# Patient Record
Sex: FEMALE | Race: WHITE
[De-identification: names, ages, dates, MRNs, and addresses within clinical notes are randomized per-mention and may not be internally consistent; named-entity substitution may affect disease eponyms.]

---

## 2017-10-21 ENCOUNTER — HOSPITAL ENCOUNTER (INPATIENT)
Dept: HOSPITAL 41 - JD.ED | Age: 82
LOS: 4 days | Discharge: HOME | DRG: 206 | End: 2017-10-25
Payer: MEDICARE

## 2017-10-21 DIAGNOSIS — R61: ICD-10-CM

## 2017-10-21 DIAGNOSIS — J18.9: Primary | ICD-10-CM

## 2017-10-21 DIAGNOSIS — I10: ICD-10-CM

## 2017-10-21 DIAGNOSIS — M25.512: ICD-10-CM

## 2017-10-21 DIAGNOSIS — E83.40: ICD-10-CM

## 2017-10-21 DIAGNOSIS — K59.09: ICD-10-CM

## 2017-10-21 DIAGNOSIS — Z91.81: ICD-10-CM

## 2017-10-21 DIAGNOSIS — Z79.82: ICD-10-CM

## 2017-10-21 DIAGNOSIS — I95.1: ICD-10-CM

## 2017-10-21 DIAGNOSIS — Z66: ICD-10-CM

## 2017-10-21 DIAGNOSIS — G25.81: ICD-10-CM

## 2017-10-21 DIAGNOSIS — M54.2: ICD-10-CM

## 2017-10-21 DIAGNOSIS — R09.02: ICD-10-CM

## 2017-10-21 DIAGNOSIS — M19.90: ICD-10-CM

## 2017-10-21 DIAGNOSIS — K21.9: ICD-10-CM

## 2017-10-21 DIAGNOSIS — H54.7: ICD-10-CM

## 2017-10-21 DIAGNOSIS — F41.9: ICD-10-CM

## 2017-10-21 DIAGNOSIS — M94.9: ICD-10-CM

## 2017-10-21 DIAGNOSIS — G20: ICD-10-CM

## 2017-10-21 DIAGNOSIS — G70.00: ICD-10-CM

## 2017-10-21 DIAGNOSIS — Z79.899: ICD-10-CM

## 2017-10-21 DIAGNOSIS — F32.9: ICD-10-CM

## 2017-10-21 DIAGNOSIS — E87.0: ICD-10-CM
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
## 2017-10-21 DIAGNOSIS — E78.5: ICD-10-CM

## 2017-10-21 DIAGNOSIS — J40: ICD-10-CM

## 2017-10-21 DIAGNOSIS — R53.82: ICD-10-CM
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
## 2017-10-21 DIAGNOSIS — Z23: ICD-10-CM

## 2017-10-21 DIAGNOSIS — R51: ICD-10-CM

## 2017-10-21 PROCEDURE — 83880 ASSAY OF NATRIURETIC PEPTIDE: CPT

## 2017-10-21 PROCEDURE — 36415 COLL VENOUS BLD VENIPUNCTURE: CPT

## 2017-10-21 PROCEDURE — 72125 CT NECK SPINE W/O DYE: CPT

## 2017-10-21 PROCEDURE — 85025 COMPLETE CBC W/AUTO DIFF WBC: CPT

## 2017-10-21 PROCEDURE — 71020: CPT

## 2017-10-21 PROCEDURE — 87040 BLOOD CULTURE FOR BACTERIA: CPT

## 2017-10-21 PROCEDURE — 80053 COMPREHEN METABOLIC PANEL: CPT

## 2017-10-21 PROCEDURE — 70450 CT HEAD/BRAIN W/O DYE: CPT

## 2017-10-21 PROCEDURE — 96365 THER/PROPH/DIAG IV INF INIT: CPT

## 2017-10-21 PROCEDURE — 99285 EMERGENCY DEPT VISIT HI MDM: CPT

## 2017-10-21 PROCEDURE — 93005 ELECTROCARDIOGRAM TRACING: CPT

## 2017-10-21 PROCEDURE — 96375 TX/PRO/DX INJ NEW DRUG ADDON: CPT

## 2017-10-21 PROCEDURE — 84484 ASSAY OF TROPONIN QUANT: CPT

## 2017-10-21 PROCEDURE — 86738 MYCOPLASMA ANTIBODY: CPT

## 2017-10-21 PROCEDURE — 73502 X-RAY EXAM HIP UNI 2-3 VIEWS: CPT

## 2017-10-21 PROCEDURE — 73030 X-RAY EXAM OF SHOULDER: CPT

## 2017-10-21 PROCEDURE — 86140 C-REACTIVE PROTEIN: CPT

## 2017-10-21 PROCEDURE — G0009 ADMIN PNEUMOCOCCAL VACCINE: HCPCS

## 2017-10-21 RX ADMIN — VITAMIN D, TAB 1000IU (100/BT) SCH UNITS: 25 TAB at 20:36

## 2017-10-21 RX ADMIN — Medication SCH MG: at 20:26

## 2017-10-21 NOTE — EDM.PDOC
ED HPI GENERAL MEDICAL PROBLEM





- General


Chief Complaint: Trauma


Stated Complaint: [facility]


Time Seen by Provider: 10/21/17 09:16





- History of Present Illness


INITIAL COMMENTS - FREE TEXT/NARRATIVE: 





84-year-old female presents emergency room brought in by EMS after falling at 

her assisted living center.





The patient denies any dizziness or palpitations. However, while putting her 

earrings on this morning the patient fell backwards she has a 17 or 18 year 

history of Parkinson's disease. She complains mostly of a headache and neck 

pain she has some right hip discomfort and left shoulder discomfort. She does 

not have chest pain or breathing difficulties. The patient feels weak however 

this is not new. Patient is not on anticoagulation at this time. She is taking 

Namenda and her Parkinson's medication. She is treated for hypertension. She 

did take her medications just before coming in. Her blood pressure is noted to 

be quite elevated upon arrival here.





The patient has had a hip replacement on the right she has some discomfort 

after the fall this seems to be improving she has some left shoulder 

discomfort. She comes in with a c-collar in place and her discomfort seems to 

be improving she did strike the back of her head she has no bleeding or 

laceration.


  ** Right Hip


Pain Score (Numeric/FACES): 5





  ** Left Shoulder


Pain Score (Numeric/FACES): 5





  ** Head


Pain Score (Numeric/FACES): 5





- Related Data


 Allergies











Allergy/AdvReac Type Severity Reaction Status Date / Time


 


No Known Allergies Allergy   Verified 10/21/17 09:33











Home Meds: 


 Home Meds





Acetaminophen 650 mg PO BID 06/10/15 [History]


Acetaminophen 650 mg PO Q4H PRN 06/10/15 [History]


Calcium Carbonate [Tums] 500 mg PO DAILY PRN 06/10/15 [History]


Carbidopa/Levodopa [Sinemet  mg] 1 tab PO BID 06/10/15 [History]


Carbidopa/Levodopa [Sinemet Cr  mg] 1 tab PO QID 06/10/15 [History]


Docusate Sodium 100 mg PO BID 06/10/15 [History]


Memantine HCl [Namenda Xr] 28 mg PO DAILY 06/10/15 [History]


Metoprolol Tartrate 12.5 mg PO BID 06/10/15 [History]


Omeprazole [Prilosec] 20 mg PO DAILY 06/10/15 [History]


Sertraline HCl [Zoloft] 75 mg PO DAILY 06/10/15 [History]


Simethicone [Gas-X] 80 mg PO QID 06/10/15 [History]


clonazePAM [Klonopin] 0.5 mg PO QPM 06/10/15 [History]


guaiFENesin/Dextromethorphan [Tussin Dm Liquid] 5 ml PO Q4H PRN 06/10/15 [

History]


rOPINIRole HCl [Requip] 4 mg PO BID 06/10/15 [History]


Aspirin [Adult Low Dose Aspirin EC] 81 mg PO DAILY 07/04/16 [History]


Cholecalciferol (Vitamin D3) [Vitamin D3] 1,000 unit PO BID 07/04/16 [History]


Magnesium Chloride [Slow-Mag] 250 mg PO DAILY 07/04/16 [History]


Omega-3/DHA/Epa/Fish Oil [Fish Oil 1,000 mg Softgel] 1,000 mg PO BID 07/04/16 [

History]


Polyethylene Glycol 3350 [MiraLAX] 17 gm PO QAM PRN 07/04/16 [History]


Sennosides [Senna] 1 tab PO BID 07/04/16 [History]


Calcium Carbonate [Calcium] 500 mg PO DAILY 10/21/17 [History]


Cyanocobalamin (Vitamin B12) [Vitamin B12] 1,000 mcg PO DAILY 10/21/17 [History]


Donepezil [Aricept] 10 mg PO BEDTIME 10/21/17 [History]


Enalapril [Vasotec] 5 mg PO DAILY 10/21/17 [History]


Gabapentin [Neurontin] 200 mg PO TID 10/21/17 [History]


Guaifenesin/Pseudoephedrne HCl [Mucinex D -60 mg Tablet] 1 tab PO BID PRN 

10/21/17 [History]


Hydrocortisone [Preparation H] 1 applic TOP BID PRN 10/21/17 [History]


Mag Hydrox/Al Hydrox/Simeth [Antacid Plus Anti-Gas] 10 ml PO DAILY 10/21/17 [

History]


Naproxen Sodium [Aleve] 220 mg PO BID 10/21/17 [History]


Oxybutynin 2.5 mg PO BID 10/21/17 [History]


Polyethylene Glycol 3350 [MiraLAX] 17 g PO DAILY 10/21/17 [History]


QUEtiapine [SEROquel] 12.5 mg PO BEDTIME 10/21/17 [History]


Simvastatin [Zocor] 10 mg PO BEDTIME 10/21/17 [History]











Past Medical History


HEENT History: Reports: Impaired Vision


Gastrointestinal History: Reports: Chronic Constipation


Other Genitourinary History: hyperhidrosis


OB/GYN History: Reports: Pregnancy


Neurological History: Reports: Parkinson's (diagnosed in 2000. Has been 

followed by Dr Braden since that time.)





- Past Surgical History


Musculoskeletal Surgical History: Reports: Hip Replacement





Social & Family History





- Tobacco Use


Smoking Status *Q: Never Smoker





- Alcohol Use


Days Per Week of Alcohol Use: 0





- Recreational Drug Use


Recreational Drug Use: No





- Living Situation & Occupation


Living situation: Reports: Assisted Living


Occupation: Retired





Review of Systems





- Review of Systems


Review Of Systems: See Below


Constitutional: Reports: Weakness.  Denies: No Symptoms, Chills, Fever


Eyes: Reports: No Symptoms


Ears: Reports: No Symptoms


Nose: Reports: No Symptoms


Mouth/Throat: Reports: No Symptoms


Respiratory: Reports: No Symptoms


Cardiovascular: Reports: No Symptoms


GI/Abdominal: Reports: No Symptoms


Genitourinary: Reports: No Symptoms


Musculoskeletal: Reports: No Symptoms


Skin: Reports: No Symptoms


Neurological: Reports: Weakness, Other (She has a history of Parkinson's)


Psychiatric: Reports: No Symptoms





ED EXAM, GENERAL





- Physical Exam


Exam: See Below


Exam Limited By: No Limitations


General Appearance: Alert, No Apparent Distress, Other (C collar in place the 

patient answers questions appropriately and is able to participate with the 

exam she is a little vague in answering some of her questions.)


Eye Exam: Bilateral Eye: EOMI, Normal Inspection


Ear Exam: Bilateral Ear: Canal Normal, TM normal


Nose: Normal Inspection, Normal Mucosa, No Blood


Throat/Mouth: Normal Inspection, Normal Lips, Normal Oropharynx


Head: Atraumatic, Normocephalic


Neck: Other (C collar in place)


Respiratory/Chest: No Respiratory Distress, Lungs Clear, Normal Breath Sounds


Cardiovascular: Regular Rate, Rhythm, No Edema, No Murmur


GI/Abdominal: Normal Bowel Sounds, Soft, Non-Tender


Extremities: Other (No crepitation or discomfort with pelvic manipulation she 

has some vague discomfort left shoulder the patient can move her right hip in 

the supine position she can lift her leg up internally and externally rotated 

against resistance without significant discomfort neurovascular status of the 

foot is normal)


Neurological: Alert, Oriented, Normal Cognition


Psychiatric: Normal Affect, Flat Affect


Skin Exam: Warm, Dry, Intact


Lymphatic: No Adenopathy





Course





- Vital Signs


Last Recorded V/S: 


 Last Vital Signs











Temp  36.2 C   10/21/17 09:18


 


Pulse  63   10/21/17 10:55


 


Resp  14   10/21/17 09:18


 


BP  197/86 H  10/21/17 10:55


 


Pulse Ox  94 L  10/21/17 09:18














- Orders/Labs/Meds


Orders: 


 Active Orders 24 hr











 Category Date Time Status


 


 EKG Documentation Completion [RC] STAT Care  10/21/17 10:54 Active


 


 Cervical Spine wo Cont [CT] Stat Exams  10/21/17 09:29 Taken


 


 Chest 2V [CR] Stat Exams  10/21/17 10:47 Taken


 


 Head wo Cont [CT] Stat Exams  10/21/17 09:29 Taken


 


 Hip Min 2V or 3V w Pelvis Rt [CR] Stat Exams  10/21/17 09:30 Taken


 


 Shoulder Comp Lt [CR] Stat Exams  10/21/17 09:30 Taken


 


 CULTURE BLOOD [BC] Stat Lab  10/21/17 12:41 Received


 


 CULTURE BLOOD [BC] Stat Lab  10/21/17 12:47 Received


 


 cefTRIAXone [Rocephin] 1 gm Med  10/21/17 13:10 Active





 Sodium Chloride 0.9% [Normal Saline] 100 ml   





 IV ONETIME   


 


 Blood Culture x2 Reflex Set [OM.PC] Stat Oth  10/21/17 12:17 Ordered








 Medication Orders





Ceftriaxone Sodium 1 gm/ (Sodium Chloride)  100 mls @ 200 mls/hr IV ONETIME ONE


   Stop: 10/21/17 13:39


   Last Admin: 10/21/17 13:18  Dose: 200 mls/hr








Labs: 


 Laboratory Tests











  10/21/17 10/21/17 10/21/17 Range/Units





  10:59 10:59 10:59 


 


WBC  7.59    (3.98-10.04)  K/mm3


 


RBC  4.30    (3.98-5.22)  M/mm3


 


Hgb  13.7    (11.2-15.7)  gm/L


 


Hct  40.7    (34.1-44.9)  %


 


MCV  94.7    (79.4-94.8)  fl


 


MCH  31.9    (25.6-32.2)  pg


 


MCHC  33.7    (32.2-35.5)  g/dl


 


RDW Std Deviation  49.3 H    (36.4-46.3)  fL


 


Plt Count  114 L    (182-369)  K/mm3


 


MPV  9.9    (9.4-12.3)  fl


 


Neutrophils % (Manual)  69 H    (40-60)  %


 


Band Neutrophils %  0    (0-10)  %


 


Lymphocytes % (Manual)  25    (20-40)  %


 


Atypical Lymphs %  0    %


 


Monocytes % (Manual)  2    (2-10)  %


 


Eosinophils % (Manual)  4    (0.7-5.8)  %


 


Basophils % (Manual)  0 L    (0.1-1.2)  


 


Platelet Estimate  Adequate    


 


Plt Morphology Comment  Normal    


 


RBC Morph Comment  Normal    


 


Sodium   148 H   (136-145)  mEq/L


 


Potassium   3.9   (3.5-5.1)  mEq/L


 


Chloride   110 H   ()  mEq/L


 


Carbon Dioxide   24   (21-32)  mEq/L


 


Anion Gap   17.9 H   (5-15)  


 


BUN   26 H   (7-18)  mg/dL


 


Creatinine   1.0   (0.55-1.02)  mg/dL


 


Est Cr Clr Drug Dosing   37.68   mL/min


 


Estimated GFR (MDRD)   53   (>60)  mL/min


 


BUN/Creatinine Ratio   26.0 H   (14-18)  


 


Glucose   108   ()  mg/dL


 


Calcium   8.8   (8.5-10.1)  mg/dL


 


Total Bilirubin   0.5   (0.2-1.0)  mg/dL


 


AST   15   (15-37)  U/L


 


ALT   8 L   (14-59)  U/L


 


Alkaline Phosphatase   70   ()  U/L


 


Troponin I    < 0.017  (0.00-0.056)  ng/mL


 


NT-Pro-B Natriuret Pep    TNP  


 


Total Protein   6.4   (6.4-8.2)  g/dl


 


Albumin   3.8   (3.4-5.0)  g/dl


 


Globulin   2.6   gm/dL


 


Albumin/Globulin Ratio   1.5   (1-2)  











Meds: 


Medications











Generic Name Dose Route Start Last Admin





  Trade Name Freq  PRN Reason Stop Dose Admin


 


Ceftriaxone Sodium 1 gm/  100 mls @ 200 mls/hr  10/21/17 13:10  10/21/17 13:18





  Sodium Chloride  IV  10/21/17 13:39  200 mls/hr





  ONETIME ONE   Administration














Discontinued Medications














Generic Name Dose Route Start Last Admin





  Trade Name Breana  PRN Reason Stop Dose Admin


 


Ceftriaxone Sodium  1,000 mg  10/21/17 12:30  





  Rocephin  IVPUSH   





  Q24H JESSY   


 


Labetalol HCl  5 mg  10/21/17 10:15  10/21/17 10:27





  Normodyne  IVPUSH  10/21/17 10:16  5 mg





  ONETIME ONE   Administration





  Protocol   


 


Labetalol HCl  5 mg  10/21/17 10:55  10/21/17 10:55





  Normodyne  IVPUSH  10/21/17 10:56  5 mg





  ONETIME ONE   Administration





  Protocol   














- Re-Assessments/Exams


Free Text/Narrative Re-Assessment/Exam: 





10/21/17 10:27


Head and neck CT were obtained which show no acute changes. Favor shoulder show 

some degenerative changes fracture dislocation visualized portion of the 

clavicle appears normal pelvis and hip show a prosthesis that appears to be in 

good alignment no obvious loosening however I will have radiology over read 

this.





C-collar removed patient is doing much better with this she has reasonable 

range of motion with her left shoulder. She states it was clicking and grinding 

while pushing her wheelchair around, this was before her fall but she cannot 

get it to make noise now. Patient's blood pressure was initially quite elevated 

at 202 she received 5 mg labetalol and this is coming down. Patient's O2 

saturation is as low as 86% at times usually around 88% to 90%. She does have 

this intermittent cough we'll go ahead and check a chest x-ray


10/21/17 12:26


Chest x-ray is suspicious for left lower lobe retrocardiac infiltrate. Patient 

has been started on oxygen and we tried to wean her off  her sats dropped to 87-

88%. Case discussed with Dr. Herrera our hospitalist who would like to get a 

radiology interpretation on this patient. I will start Rocephin 1 g after 

obtaining blood cultures.








10/21/17 13:19


Radiology thought she was developing bronchial wall thickening in the lower 

lobes suspicious of bronchitis with hypoxia patient be admitted on a suspicion 

for pneumonia.





Departure





- Departure


Time of Disposition: 13:10


Disposition: Admitted As Inpatient 66


Clinical Impression: 


 Hypoxia, Pneumonia, Hypertension








- Discharge Information


Referrals: 


RatPhill sandoval MD [Primary Care Provider] - 


Forms:  ED Department Discharge





- My Orders


Last 24 Hours: 


My Active Orders





10/21/17 09:29


Cervical Spine wo Cont [CT] Stat 


Head wo Cont [CT] Stat 





10/21/17 09:30


Hip Min 2V or 3V w Pelvis Rt [CR] Stat 


Shoulder Comp Lt [CR] Stat 





10/21/17 10:47


Chest 2V [CR] Stat 





10/21/17 10:54


EKG Documentation Completion [RC] STAT 





10/21/17 12:17


Blood Culture x2 Reflex Set [OM.PC] Stat 





10/21/17 12:41


CULTURE BLOOD [BC] Stat 





10/21/17 12:47


CULTURE BLOOD [BC] Stat 





10/21/17 13:10


cefTRIAXone [Rocephin] 1 gm   Sodium Chloride 0.9% [Normal Saline] 100 ml IV 

ONETIME 














- Assessment/Plan


Last 24 Hours: 


My Active Orders





10/21/17 09:29


Cervical Spine wo Cont [CT] Stat 


Head wo Cont [CT] Stat 





10/21/17 09:30


Hip Min 2V or 3V w Pelvis Rt [CR] Stat 


Shoulder Comp Lt [CR] Stat 





10/21/17 10:47


Chest 2V [CR] Stat 





10/21/17 10:54


EKG Documentation Completion [RC] STAT 





10/21/17 12:17


Blood Culture x2 Reflex Set [OM.PC] Stat 





10/21/17 12:41


CULTURE BLOOD [BC] Stat 





10/21/17 12:47


CULTURE BLOOD [BC] Stat 





10/21/17 13:10


cefTRIAXone [Rocephin] 1 gm   Sodium Chloride 0.9% [Normal Saline] 100 ml IV 

ONETIME

## 2017-10-21 NOTE — PCM.HP
H&P History of Present Illness





- General


Date of Service: 10/21/17


Admit Problem/Dx: 


 Admission Diagnosis/Problem





Admission Diagnosis/Problem      Pneumonia








Source of Information: Patient, Family, Old Records, Provider, RN Notes Reviewed


History Limitations: Reports: Physical Impairment





- History of Present Illness


Initial Comments - Free Text/Narative: 


This is an 84-year-old elderly white female with past medical history of 

Hypertension, Hyperlipidemia, GERD, Myasthenia Gravis, Osteoarthritis, Chronic 

Malaise and Fatigue, Bone and Cartilage disorder, Shortness of Breath, 

Magnesium Metabolism Disorder, Hyperhidrosis, Restless Leg Syndrome, Anxiety, 

Depression, and Paralysis Agitans/Parkinson's Disease who presents to emergency 

department for evaluation after suffering a a non traumatic fall at her 

assisted living facility. She denies any prodromal symptoms and reports no LOC. 





Patient carries a history of Parkinson's disease for over 17 or 18 years now. 

On presentation to the emergency department, she complains of headache, neck 

pain, some right hip discomfort and left shoulder pain. While in ED, she was 

found to have blood pressure readings of 199/94 and 197/96 mm per mercury. 





At time of my examination, she denies any of the above issues except for 

chronic generalized weakness. Her initial workup shows a CBC remarkable for 

platelet of 114, and neutrophils of 69%. Her chemistry is remarkable for sodium 

of 148, chloride of 110, anion gap of 17.9, BUN of 26, and ALT of 8. Her 

initial troponin is less than 0.017. Her head CT scan, cervical CT scan, and 

shoulder x-ray are all negative for acute abnormal findings. However her chest x

-ray report reads bronchial wall thickening in the lower lobe suspicious for 

bronchitis.





Patient is being admitted for bronchitis, malignant hypertension and status 

post non-traumatic fall. She is DNR/DNI








  ** Right Hip


Pain Score (Numeric/FACES): 5





  ** Left Shoulder


Pain Score (Numeric/FACES): 5





  ** Head


Pain Score (Numeric/FACES): 5





- Related Data


Allergies/Adverse Reactions: 


 Allergies











Allergy/AdvReac Type Severity Reaction Status Date / Time


 


No Known Allergies Allergy   Verified 10/21/17 09:33











Home Medications: 


 Home Meds





Acetaminophen 650 mg PO BID 06/10/15 [History]


Acetaminophen 650 mg PO Q4H PRN 06/10/15 [History]


Calcium Carbonate [Tums] 500 mg PO DAILY PRN 06/10/15 [History]


Carbidopa/Levodopa [Sinemet  mg] 1 tab PO BID 06/10/15 [History]


Carbidopa/Levodopa [Sinemet Cr  mg] 1 tab PO QID 06/10/15 [History]


Docusate Sodium 100 mg PO BID 06/10/15 [History]


Memantine HCl [Namenda Xr] 28 mg PO DAILY 06/10/15 [History]


Metoprolol Tartrate 12.5 mg PO BID 06/10/15 [History]


Omeprazole [Prilosec] 20 mg PO DAILY 06/10/15 [History]


Sertraline HCl [Zoloft] 75 mg PO DAILY 06/10/15 [History]


Simethicone [Gas-X] 80 mg PO QID 06/10/15 [History]


clonazePAM [Klonopin] 0.5 mg PO QPM 06/10/15 [History]


guaiFENesin/Dextromethorphan [Tussin Dm Liquid] 5 ml PO Q4H PRN 06/10/15 [

History]


rOPINIRole HCl [Requip] 4 mg PO BID 06/10/15 [History]


Aspirin [Adult Low Dose Aspirin EC] 81 mg PO DAILY 07/04/16 [History]


Cholecalciferol (Vitamin D3) [Vitamin D3] 1,000 unit PO BID 07/04/16 [History]


Omega-3/DHA/Epa/Fish Oil [Fish Oil 1,000 mg Softgel] 1,000 mg PO BID 07/04/16 [

History]


Polyethylene Glycol 3350 [MiraLAX] 17 gm PO QAM PRN 07/04/16 [History]


Sennosides [Senna] 1 tab PO BID 07/04/16 [History]


Calcium Carbonate [Calcium] 500 mg PO DAILY 10/21/17 [History]


Cyanocobalamin (Vitamin B12) [Vitamin B12] 1,000 mcg PO DAILY 10/21/17 [History]


Donepezil [Aricept] 10 mg PO BEDTIME 10/21/17 [History]


Enalapril [Vasotec] 5 mg PO DAILY 10/21/17 [History]


Gabapentin [Neurontin] 200 mg PO TID 10/21/17 [History]


Guaifenesin/Pseudoephedrne HCl [Mucinex D -60 mg Tablet] 1 tab PO BID PRN 

10/21/17 [History]


Hydrocortisone [Preparation H] 1 applic TOP BID PRN 10/21/17 [History]


Mag Hydrox/Al Hydrox/Simeth [Antacid Plus Anti-Gas] 10 ml PO DAILY PRN 10/21/17 

[History]


Naproxen Sodium [Aleve] 220 mg PO BID 10/21/17 [History]


Oxybutynin 2.5 mg PO BID 10/21/17 [History]


Polyethylene Glycol 3350 [MiraLAX] 17 g PO DAILY 10/21/17 [History]


QUEtiapine [SEROquel] 12.5 mg PO BEDTIME 10/21/17 [History]


Simvastatin [Zocor] 10 mg PO BEDTIME 10/21/17 [History]











Past Medical History


HEENT History: Reports: Impaired Vision


Other HEENT History: esophogeal reflux


Cardiovascular History: Reports: High Cholesterol, Hypertension


Respiratory History: Reports: SOB


Gastrointestinal History: Reports: Chronic Constipation


Other Genitourinary History: hyperhidrosis


OB/GYN History: Reports: Pregnancy


Musculoskeletal History: Reports: Osteoarthritis


Other Musculoskeletal History: cervical disc degeneration


Neurological History: Reports: Parkinson's (diagnosed in 2000. Has been 

followed by Dr Braden since that time.)


Other Neuro History: unsteady gait, altered mental status, RLS, myestenia gravis


Psychiatric History: Reports: Anxiety, Depression





- Past Surgical History


Musculoskeletal Surgical History: Reports: Hip Replacement





Social & Family History





- Family History


Family Medical History: Noncontributory





- Tobacco Use


Smoking Status *Q: Never Smoker


Second Hand Smoke Exposure: No





- Caffeine Use


Caffeine Use: Reports: None





- Alcohol Use


Days Per Week of Alcohol Use: 0





- Recreational Drug Use


Recreational Drug Use: No





- Living Situation & Occupation


Living situation: Reports: Assisted Living


Occupation: Retired





H&P Review of Systems





- Review of Systems:


Review Of Systems: See Below


General: Reports: Weakness.  Denies: Fever, Chills, Malaise


HEENT: Reports: No Symptoms


Pulmonary: Denies: Shortness of Breath


Cardiovascular: Denies: Chest Pain


Gastrointestinal: Denies: Abdominal Pain, Nausea, Vomiting


Genitourinary: Reports: No Symptoms


Musculoskeletal: Reports: No Symptoms


Skin: Denies: Cyanosis, Diaphoresis, Bruising, Erythema


Psychiatric: Denies: Confusion, Depression, Anxiety


Neurological: Reports: Pre-Existing Deficit, Weakness, Gait Disturbance.  Denies

: Confusion


Hematologic/Lymphatic: Reports: No Symptoms


Immunologic: Reports: No Symptoms





Exam





- Exam


Exam: See Below





- Vital Signs


Vital Signs: 


 Last Vital Signs











Temp  36.2 C   10/21/17 09:18


 


Pulse  63   10/21/17 10:55


 


Resp  14   10/21/17 09:18


 


BP  197/86 H  10/21/17 10:55


 


Pulse Ox  94 L  10/21/17 09:18











Weight: 70.307 kg





- Exam


General: Alert, Oriented, Cooperative


HEENT: Conjunctiva Clear, EACs Clear, EOMI, Hearing Intact, Mucosa Moist & Pink

, Nares Patent, Normal Nasal Septum, Posterior Pharynx Clear, Pupils Equal, 

Pupils Reactive


Neck: Supple, Trachea Midline


Lungs: Clear to Auscultation, Normal Respiratory Effort


Cardiovascular: Regular Rate, Regular Rhythm


GI/Abdominal Exam: Normal Bowel Sounds, Soft, Non-Tender, No Organomegaly, No 

Distention, No Abnormal Bruit


 (Female) Exam: Deferred


Rectal (Female) Exam: Deferred


Back Exam: Normal Inspection, Decreased Range of Motion


Extremities: Normal Inspection, Normal Range of Motion, Non-Tender, No Pedal 

Edema, Normal Capillary Refill, Other (Bradykinesia)


Peripheral Pulses: 2+: Posterior Tibial (L), Posterior Tibial (R), Dorsalis 

Pedis (L), Dorsalis Pedis (R)


Skin: Warm, Dry, Intact


Neuro Extensive - Mental Status: Oriented x3, Normal Cognition, Memory Intact


Neuro Extensive - Motor, Sensory, Reflexes: CN II-XII Intact (limitedt but 

fairly intact), Abnormal Gait, Tremor (resting)


Psychiatric: Alert, Normal Mood.  No: Normal Affect, Withdrawal Symptoms





- Patient Data


Result Diagrams: 


 10/22/17 06:14





 10/22/17 06:14





*Q Meaningful Use (ADM)





- VTE *Q


VTE Criteria *Q: 








- Stroke *Q


Stroke Criteria *Q: 








- AMI *Q


AMI Criteria *Q: 





Problem List Initiated/Reviewed/Updated: Yes


Assessment/Plan Comment:: 


Assessment/Plan:


Acute:


Status Post Fall


   - Non-traumatic


   - She is not on blood thinners


   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


   - All imaging studies are negative: Right Hip XR, Head CT and Cervical CT 

scan  


   - Fall Precautions


   - PT/OT for eval





Bronchitis


   - CXR report reads bronchial lower lobe thickening; no infiltrate or 

consolidations


   - She has cough but w/o fever    


   - I do not think she has pneumonia


   - She already received IV ATB in ED; Continue IV Rocephin and will add 

Azithromycin


   - Mycoplasma Ag/Strep pneumonia Test and Sputum Cx





Malignant HTN


   - Has hx/o HTN


   - Blood pressure readings of 199/94 and 197/96 mm per mercury on admission


   - Resume Home BP Meds


   - PRN anti-hypertensive medications for BP > 140/90





Chronic:


Hypertension


Hyperlipidemia


GERD


Myasthenia gravis


Osteoarthritis


Malaise and Fatigue


Bone and cartilage disorder


Shortness of breath


Magnesium metabolism disorder


Hyperhidrosis


Restless leg syndrome


Anxiety


Depression


Paralysis Agitans/Parkinson's Disease





Plan:


Admit to Med-Surg 


Resume Home Meds


Routine AM Labs


PT/OT consult


Fall Precautions


CM/SW for d/c planning


Additional orders as above


Code status: DNR/DNI

## 2017-10-22 RX ADMIN — VITAMIN D, TAB 1000IU (100/BT) SCH UNITS: 25 TAB at 20:26

## 2017-10-22 RX ADMIN — CYANOCOBALAMIN TAB 1000 MCG SCH MCG: 1000 TAB at 10:05

## 2017-10-22 RX ADMIN — HYDRALAZINE HYDROCHLORIDE PRN MG: 20 INJECTION INTRAMUSCULAR; INTRAVENOUS at 12:31

## 2017-10-22 RX ADMIN — Medication SCH MG: at 10:14

## 2017-10-22 RX ADMIN — VITAMIN D, TAB 1000IU (100/BT) SCH UNITS: 25 TAB at 12:07

## 2017-10-22 RX ADMIN — CARBIDOPA AND LEVODOPA SCH: 25; 100 TABLET, EXTENDED RELEASE ORAL at 02:26

## 2017-10-22 RX ADMIN — ALUMINUM HYDROXIDE, MAGNESIUM HYDROXIDE, AND SIMETHICONE SCH ML: 200; 200; 20 SUSPENSION ORAL at 12:06

## 2017-10-22 RX ADMIN — CARBIDOPA AND LEVODOPA SCH: 25; 100 TABLET, EXTENDED RELEASE ORAL at 02:20

## 2017-10-22 RX ADMIN — CARBIDOPA AND LEVODOPA SCH TAB: 25; 100 TABLET, EXTENDED RELEASE ORAL at 14:25

## 2017-10-22 RX ADMIN — CARBIDOPA AND LEVODOPA SCH TAB: 25; 100 TABLET, EXTENDED RELEASE ORAL at 10:07

## 2017-10-22 RX ADMIN — CARBIDOPA AND LEVODOPA SCH TAB: 25; 100 TABLET, EXTENDED RELEASE ORAL at 20:29

## 2017-10-22 RX ADMIN — CARBIDOPA AND LEVODOPA SCH TAB: 25; 100 TABLET, EXTENDED RELEASE ORAL at 02:20

## 2017-10-22 RX ADMIN — Medication SCH MG: at 20:35

## 2017-10-22 NOTE — CR
Left shoulder: Three views of the left shoulder were obtained.

 

Comparison: No prior left shoulder study.

 

Joint space narrowing and degenerative sclerosis and subchondral 

cystic change is seen within the glenohumeral joint.  Inferior 

spurring is also noted within the glenohumeral joint.  Calcification 

is seen off the medial inferior shoulder believed to be dystrophic and

 incidental.  Acromioclavicular joint shows minimal degenerative 

change.  No acute fracture, dislocation or other bony abnormality is 

seen.

 

Impression:

1.  Severe degenerative change within the glenohumeral joint.

2.  Other incidental findings.  Nothing acute is appreciated.

 

Diagnostic code #2

## 2017-10-22 NOTE — CT
Head CT

 

Technique: Multiple axial sections through the brain were obtained.  

Intravenous contrast was not utilized.

 

Comparison: Previous head CT study of 07/04/16.

 

Findings: Ventricles along with basal cisterns and sulci over the 

convexities are mildly prominent for the patient's age.  Diminished 

density is noted within portions of the periventricular and 

subcortical white matter compatible with small vessel ischemic 

demyelination change.  No other abnormal parenchymal densities are 

seen.  No evidence of intracranial hemorrhage.  No midline shift or 

mass effect is seen.

 

Bone window settings were reviewed which show no acute calvarial 

abnormality.  Visualized sinuses are clear.

 

Impression:

1.  Senescent change as described above.

2.  Nothing acute is appreciated on noncontrast head CT study.  

 

Diagnostic code #2

 

I agree with preliminary report issued by [facility] ([facility] 

preliminary report dictated on 10/21/17, 11:19 AM Central Time)

## 2017-10-22 NOTE — CT
CT cervical spine

 

Technique: Multiple axial sections were obtained from above C1 

inferiorly to the mid T2 level.  Reconstructed sagittal and coronal 

images were reviewed.

 

Comparison: Prior CT cervical spine exam of 09/18/12 is available.

 

Findings: Moderate disc space narrowing is noted at C2-C3 through 

C4-C5.  Severe disc space narrowing is noted C5-C6 and C6-C7.  

Degenerative endplate sclerosis is noted C5-C6 and C6-C7.  Posterior 

osteophytes are noted at C3-C4, C5-C6 and C6-C7.  Anterior osteophytes

 are scattered throughout the cervical spine which are most prominent 

at C5-C6 and C6-C7.  Degenerative uncovertebral change is seen 

throughout the cervical spine which are most prominent at C5-C6 and 

C6-C7.  Degenerative change is noted between the dens and anterior 

arch of C1.  Moderate left-sided neural foraminal stenosis is noted at

 C3-C4.  Mild to moderate bilateral neural foraminal stenosis is noted

 at C4-C5.  Moderate bilateral neural foraminal stenosis is noted at 

C5-C6.

 

Vertebral bodies and posterior arches are intact with no fracture 

being seen.  No abnormal subluxation is noted.

 

Diffuse degenerative apophyseal change is scattered throughout the 

cervical spine.

 

Impression:

1.  Diffuse degenerative change as described above.

2.  Nothing acute is seen on CT study of the cervical spine.

3.  When compared to prior cervical spine CT exam, degenerative change

 has mildly progressed.

 

Diagnostic code #2

 

I agree with preliminary report issued by [employer] ([facility] 

preliminary report dictated on 10/21/17, 11:21 AM Central Time)

## 2017-10-22 NOTE — CR
Chest:  Two views of the chest were obtained.

 

Comparison: Previous chest x-ray of 06/10/15.

 

Slight bronchial wall thickening is seen within portions of the 

perihilar markings.  Minimal bronchitis is possible.  Lungs otherwise 

are clear.  Heart size appears at the upper limits of normal.  

Tortuous thoracic aorta is seen.  Mild degenerative change is 

scattered within the spine.

 

Impression:

1.  Possible slight bronchitis within the right perihilar region.

2.  Other incidental findings.

 

Diagnostic code #3

 

Agree with preliminary report issued by [employer] ([facility] 

preliminary report dictated on 10/21/17, 1:33 PM Central Time)

## 2017-10-22 NOTE — PCM.PN
- General Info


Date of Service: 10/22/17


Admission Dx/Problem (Free Text): 


 Admission Diagnosis/Problem





Admission Diagnosis/Problem      Pneumonia








Subjective Update: 


Follow Up





Functional Status: Reports: Pain Controlled, Tolerating Diet, Urinating, New 

Symptoms (Chest pain with sneezing)





- Review of Systems


General: Denies: Fever, Weakness, Fatigue, Malaise, Chills


HEENT: Reports: No Symptoms


Pulmonary: Denies: Shortness of Breath


Cardiovascular: Reports: Chest Pain


Gastrointestinal: Denies: Abdominal Pain, Nausea, Vomiting


Genitourinary: Reports: No Symptoms


Musculoskeletal: Reports: No Symptoms


Skin: Denies: Cyanosis


Neurological: Reports: Difficulty Walking, Gait Disturbance.  Denies: Confusion

, Weakness


Psychiatric: Denies: Depression, Anxiety, Agitation, Hallucinations


Systems Review Comment:: 


No significant overnight or acute issues. She slept good. She reports chest 

pain with sneezing. Her HRs slightly dropped in the upper 50s. She has no other 

new complaints.








- Patient Data


Vitals - Most Recent: 


 Last Vital Signs











Temp  36.4 C   10/22/17 05:20


 


Pulse  59 L  10/22/17 05:20


 


Resp  18   10/22/17 05:20


 


BP  138/78   10/22/17 04:00


 


Pulse Ox  93 L  10/22/17 05:20











Weight - Most Recent: 70.307 kg


I&O - Last 24 Hours: 


 Intake & Output











 10/21/17 10/22/17 10/22/17





 22:59 06:59 14:59


 


Intake Total  400 


 


Balance  400 











Lab Results Last 24 Hours: 


 Laboratory Results - last 24 hr











  10/22/17 Range/Units





  06:14 


 


WBC  5.84  (3.98-10.04)  K/mm3


 


RBC  4.20  (3.98-5.22)  M/mm3


 


Hgb  13.4  (11.2-15.7)  gm/L


 


Hct  40.4  (34.1-44.9)  %


 


MCV  96.2 H  (79.4-94.8)  fl


 


MCH  31.9  (25.6-32.2)  pg


 


MCHC  33.2  (32.2-35.5)  g/dl


 


RDW Std Deviation  51.1 H  (36.4-46.3)  fL


 


Plt Count  108 L  (182-369)  K/mm3


 


MPV  10.5  (9.4-12.3)  fl


 


Neut % (Auto)  62.1  (34.0-71.1)  %


 


Lymph % (Auto)  22.3  (19.3-51.7)  %


 


Mono % (Auto)  6.8  (4.7-12.5)  %


 


Eos % (Auto)  8.2 H  (0.7-5.8)  


 


Baso % (Auto)  0.3  (0.1-1.2)  %


 


Neut # (Auto)  3.62  (1.56-6.13)  K/mm3


 


Lymph # (Auto)  1.30  (1.18-3.74)  K/mm3


 


Mono # (Auto)  0.40 H  (0.24-0.36)  K/mm3


 


Eos # (Auto)  0.48 H  (0.04-0.36)  K/mm3


 


Baso # (Auto)  0.02  (0.01-0.08)  K/mm3











Med Orders - Current: 


 Current Medications





Acetaminophen (Tylenol)  650 mg PO Q4H PRN


   PRN Reason: Pain


Acetaminophen (Tylenol)  650 mg PO BID [facility]


   Last Admin: 10/21/17 20:36 Dose:  650 mg


Hydrocodone Bitart/Acetaminophen (Norco 325-5 Mg)  1 tab PO Q4H PRN


   PRN Reason: Pain (moderate 4-6)


Al Hydroxide/Mg Hydroxide (Mag-Al Plus)  10 ml PO DAILY [facility]


Albuterol/Ipratropium (Duoneb 3.0-0.5 Mg/3 Ml)  3 ml NEB Q4H PRN


   PRN Reason: Shortness Of Breath/wheezing


Aspirin (Halfprin)  81 mg PO DAILY [facility]


Bisacodyl (Dulcolax)  5 mg PO DAILY PRN


   PRN Reason: Constipation


Calcium Carbonate/Glycine (Tums)  500 mg PO DAILY [facility]


Calcium Carbonate/Glycine (Tums)  500 mg PO DAILY PRN


   PRN Reason: Heartburn


Carbidopa/Levodopa (Sinemet Cr  Mg)  2 tab PO Q6H [facility]


   Last Admin: 10/22/17 02:26 Dose:  Not Given


Carbidopa/Levodopa (Sinemet  Mg)  1 tab PO DAILY@0600 [facility]


   Last Admin: 10/22/17 05:31 Dose:  1 tab


Cholecalciferol (Vitamin D3)  1,000 units PO BID [facility]


   Last Admin: 10/21/17 20:36 Dose:  1,000 units


Clonazepam (Klonopin)  0.5 mg PO QPM [facility]


   Last Admin: 10/21/17 17:24 Dose:  0.5 mg


Cyanocobalamin (Vitamin B12)  1,000 mcg PO DAILY [facility]


Docusate Sodium (Colace)  100 mg PO BID PRN


   PRN Reason: Constipation


Donepezil HCl (Aricept)  10 mg PO BEDTIME [facility]


   Last Admin: 10/21/17 20:31 Dose:  10 mg


Enalapril Maleate (Vasotec)  5 mg PO DAILY [facility]


Gabapentin (Neurontin)  200 mg PO TID [facility]


   Last Admin: 10/21/17 20:29 Dose:  200 mg


Guaifenesin/Phenylephrine HCl (Robitussin Dm)  5 ml PO Q4H PRN


   PRN Reason: Cough


Hydralazine HCl (Apresoline)  10 mg IVPUSH Q4H PRN


   PRN Reason: Hypertension


Hydrocortisone (Hydrocortisone 1% Crm)  0 gm TOP BID PRN


   PRN Reason: Pain


Hydromorphone HCl (Dilaudid)  0.25 mg IVPUSH Q2H PRN


   PRN Reason: Pain (severe 7-10)


Azithromycin 500 mg/ Sodium (Chloride)  250 mls @ 250 mls/hr IV Q24H [facility]


Ceftriaxone Sodium 1 gm/ (Sodium Chloride)  100 mls @ 200 mls/hr IV Q24H [facility]


Lorazepam (Ativan)  0.5 mg IV Q6H PRN


   PRN Reason: Anxiety


Magnesium Sulfate (Pharmacy To Dose - Magnesium Replacement)  1 dose .XX 

ASDIRECTED [facility]


Metoprolol Tartrate (Lopressor)  12.5 mg PO BID [facility]


   Last Admin: 10/21/17 20:32 Dose:  12.5 mg


Metoprolol Tartrate (Lopressor)  5 mg IVPUSH Q4H PRN


   PRN Reason: Tachycardia


Memantine Xr [ (Nemenda Xr] 28mg)  28 mg PO DAILY [facility]


Naproxen Sodium (220mg)  220 mg PO BID [facility]


Ondansetron HCl (Zofran)  4 mg IV Q6H PRN


   PRN Reason: Nausea/Vomiting


Oxybutynin Chloride (Oxybutynin)  2.5 mg PO BID [facility]


   Last Admin: 10/21/17 20:27 Dose:  2.5 mg


Pantoprazole Sodium (Protonix***)  40 mg PO DAILY [facility]


Polyethylene Glycol (Miralax)  17 gm PO DAILY PRN


   PRN Reason: Constipation


Polyethylene Glycol (Miralax)  17 gm PO DAILY [facility]


Potassium Chloride (Pharmacy To Dose - Potassium Replacement)  1 dose .XX 

ASDIRECTED [facility]


Quetiapine Fumarate (Seroquel)  12.5 mg PO BEDTIME [facility]


   Last Admin: 10/21/17 20:29 Dose:  12.5 mg


Ropinirole HCl (Requip)  4 mg PO BID [facility]


   Last Admin: 10/21/17 20:35 Dose:  4 mg


Saccharomyces Boulardii (Florastor)  500 mg PO BID [facility]


   Last Admin: 10/21/17 20:26 Dose:  500 mg


Senna (Senna)  8.6 mg PO BID [facility]


   Last Admin: 10/21/17 20:36 Dose:  8.6 mg


Senna/Docusate Sodium (Senna Plus)  1 tab PO BID PRN


   PRN Reason: Constipation


Sertraline HCl (Zoloft)  75 mg PO DAILY [facility]


Simethicone (Simethicone)  80 mg PO QID [facility]


   Last Admin: 10/21/17 20:25 Dose:  80 mg


Simvastatin (Zocor)  10 mg PO BEDTIME [facility]


   Last Admin: 10/21/17 20:33 Dose:  10 mg


Temazepam (Restoril)  7.5 mg PO BEDTIME PRN


   PRN Reason: Sleep





Discontinued Medications





Acetaminophen (Tylenol)  650 mg PO NOW ONE


   Stop: 10/21/17 13:36


   Last Admin: 10/21/17 13:39 Dose:  650 mg


Ceftriaxone Sodium (Rocephin)  1,000 mg IVPUSH Q24H [facility]


   Last Admin: 10/21/17 13:22 Dose:  Not Given


Ceftriaxone Sodium 1 gm/ (Sodium Chloride)  100 mls @ 200 mls/hr IV ONETIME ONE


   Stop: 10/21/17 13:39


   Last Admin: 10/21/17 13:18 Dose:  200 mls/hr


Azithromycin 500 mg/ Sodium (Chloride)  250 mls @ 250 mls/hr IV ONETIME ONE


   Stop: 10/21/17 17:10


   Last Admin: 10/21/17 17:23 Dose:  250 mls/hr


Labetalol HCl (Normodyne)  5 mg IVPUSH ONETIME ONE


   PRN Reason: Protocol


   Stop: 10/21/17 10:16


   Last Admin: 10/21/17 10:27 Dose:  5 mg


Labetalol HCl (Normodyne)  5 mg IVPUSH ONETIME ONE


   PRN Reason: Protocol


   Stop: 10/21/17 10:56


   Last Admin: 10/21/17 10:55 Dose:  5 mg











- Exam


General: Alert, Oriented, Cooperative, No Acute Distress


HEENT: Pupils Equal, Pupils Reactive, EOMI, Mucous Membr. Moist/Pink


Neck: Supple, Trachea Midline, No JVD


Lungs: Clear to Auscultation, Normal Respiratory Effort


Cardiovascular: Regular Rate, Regular Rhythm


GI/Abdominal Exam: Normal Bowel Sounds, Soft, Non-Tender, No Organomegaly, No 

Distention, No Abnormal Bruit, No Mass


 (Female) Exam: Deferred


Back Exam: Normal Inspection, Decreased Range of Motion


Extremities: Normal Inspection, Non-Tender, No Pedal Edema, Normal Capillary 

Refill, Other (bradykinesia)


Peripheral Pulses: 2+: Dorsalis Pedis (L), Dorsalis Pedis (R)


Skin: Warm, Dry, Intact


Neurological: No New Focal Deficit.  No: Normal Gait


Psy/Mental Status: Alert, Normal Mood.  No: Normal Affect, Anxious, Agitated, 

Suicidal Ideation, Withdrawal Symptoms





- Problem List Review


Problem List Initiated/Reviewed/Updated: Yes





- My Orders


Last 24 Hours: 


My Active Orders





10/21/17 15:57


Height and Weight [RC] DAILY 


Intake and Output [RC] 04,16 


Oxygen Therapy [RC] PRN 


Pulse Oximetry [RC] PRN 


Up With Assistance [RC] ASDIRECTED 


Up ad Tammy [RC] ASDIRECTED 


VTE/DVT Education [RC] PER UNIT ROUTINE 


Vital Signs [RC] Q4HR 


Acetaminophen/HYDROcodone [Norco 325-5 MG]   1 tab PO Q4H PRN 


Albuterol/Ipratropium [DuoNeb 3.0-0.5 MG/3 ML]   3 ml NEB Q4H PRN 


Bisacodyl [Dulcolax]   5 mg PO DAILY PRN 


Docusate Sodium [Colace]   100 mg PO BID PRN 


Docusate Sodium/Sennosides [Senna Plus]   1 tab PO BID PRN 


HYDROmorphone [Dilaudid]   0.25 mg IVPUSH Q2H PRN 


LORazepam [Ativan]   0.5 mg IV Q6H PRN 


Ondansetron [Zofran]   4 mg IV Q6H PRN 


Polyethylene Glycol 3350 [MiraLAX]   17 gm PO DAILY PRN 


Temazepam [Restoril]   7.5 mg PO BEDTIME PRN 


Sequential Compression Device [OM.PC] Per Unit Routine 


Resuscitation Status Routine 





10/21/17 16:03


Antiembolic Devices [RC] QSHIFT 





10/21/17 16:05


RT Aerosol Therapy [RC] ASDIRECTED 


Acetaminophen [Tylenol]   650 mg PO Q4H PRN 


Calcium Carbonate [Tums]   500 mg PO DAILY PRN 


Dextromethorphan/guaiFENesin [Robitussin DM]   5 ml PO Q4H PRN 


Hydrocortisone [Hydrocortisone 1% Crm]   0 gm TOP BID PRN 





10/21/17 16:10


CULTURE SPUTUM + SMEAR [RM] Stat 


STREP PNEUMONIAE ANTIGEN [MREF] Stat 





10/21/17 17:00


Simethicone   80 mg PO QID 





10/21/17 17:02


Metoprolol Tartrate [Lopressor]   5 mg IVPUSH Q4H PRN 


hydrALAZINE [Apresoline]   10 mg IVPUSH Q4H PRN 





10/21/17 17:15


Magnesium Rep Pharmacy to Dose [Pharmacy to Dose - Magnesium Replacement]   1 

dose .XX ASDIRECTED 


Potassium Rep Pharmacy to Dose [Pharmacy to Dose - Potassium Replacement]   1 

dose .XX ASDIRECTED 





10/21/17 18:00


ClonazePAM [KlonoPIN]   0.5 mg PO QPM 





10/21/17 21:00


Acetaminophen [Tylenol]   650 mg PO BID 


Cholecalciferol (Vitamin D3) [Vitamin D3]   1,000 units PO BID 


Donepezil [Aricept]   10 mg PO BEDTIME 


Gabapentin [Neurontin]   200 mg PO TID 


Metoprolol Tartrate [Lopressor]   12.5 mg PO BID 


Naproxen Sodium   220 mg PO BID 


Oxybutynin   2.5 mg PO BID 


QUEtiapine [SEROquel]   12.5 mg PO BEDTIME 


Saccharomyces Boulardii [Florastor]   500 mg PO BID 


Sennosides [Senna]   8.6 mg PO BID 


Simvastatin [Zocor]   10 mg PO BEDTIME 


rOPINIRole [Requip]   4 mg PO BID 





10/21/17 Dinner


Regular Diet [DIET] 





10/22/17 06:00


Carbidopa/Levodopa [Sinemet  mg]   1 tab PO DAILY@0600 





10/22/17 06:14


CRP [C-REACTIVE PROTEIN] [CHEM] AM 





10/22/17 09:00


Alum Hydrox/Mag Hydrox/Simeth [Mag-Al Plus]   10 ml PO DAILY 


Aspirin [Halfprin]   81 mg PO DAILY 


Calcium Carbonate [Tums]   500 mg PO DAILY 


Cyanocobalamin (Vitamin B12) [Vitamin B12]   1,000 mcg PO DAILY 


Enalapril [Vasotec]   5 mg PO DAILY 


Memantine HCl   28 mg PO DAILY 


Pantoprazole [ProTONIX***]   40 mg PO DAILY 


Polyethylene Glycol 3350 [MiraLAX]   17 gm PO DAILY 


Sertraline [Zoloft]   75 mg PO DAILY 





10/22/17 13:00


cefTRIAXone [Rocephin] 1 gm   Sodium Chloride 0.9% [Normal Saline] 100 ml IV 

Q24H 





10/22/17 16:00


Azithromycin [Zithromax] 500 mg   Sodium Chloride 0.9% [Normal Saline] 250 ml 

IV Q24H 





10/23/17 05:11


CBC WITH AUTO DIFF [HEME] AM 


CRP [C-REACTIVE PROTEIN] [CHEM] AM 





10/24/17 05:11


CBC WITH AUTO DIFF [HEME] AM 


CRP [C-REACTIVE PROTEIN] [CHEM] AM 





10/25/17 05:11


CBC WITH AUTO DIFF [HEME] AM 


CRP [C-REACTIVE PROTEIN] [CHEM] AM 





10/26/17 05:11


CRP [C-REACTIVE PROTEIN] [CHEM] AM 














- Plan


Plan:: 


Assessment/Plan:


Acute:


Bronchitis


   - CXR report reads bronchial lower lobe thickening; no infiltrate or 

consolidations


   - She has cough but w/o fever    


   - I do not think she has pneumonia


   - She already received IV ATB in ED; Continue IV Rocephin and will add 

Azithromycin


   - Mycoplasma Ag/Strep pneumonia Test and Sputum Cx





Malignant HTN, Improved


   - Has hx/o HTN


   - Blood pressure readings of 199/94 and 197/96 mm per mercury on admission


   - Resume Home BP Meds


   - PRN anti-hypertensive medications for BP > 140/90


   - Will add low dose Clonddine if needed


   - Continue to monitor





Mild Hypernatremia, Improved


   - --> 146


   - She is on psychotropic meds


   - Continue to monitor





S/p Status Post Fall


   - Non-traumatic


   - She is not on blood thinners


   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


   - All imaging studies are negative: Right Hip XR, Head CT and Cervical CT 

scan  


   - Fall Precautions


   - PT/OT for eval





Chronic:


Hypertension


Hyperlipidemia


GERD


Myasthenia gravis


Osteoarthritis


Malaise and Fatigue


Bone and cartilage disorder


Shortness of breath


Magnesium metabolism disorder


Hyperhidrosis


Restless leg syndrome


Anxiety


Depression


Paralysis Agitans/Parkinson's Disease





Plan:


She is clinically stable


Continue current treatment


Routine AM Labs


PT/OT consult


Fall Precautions


CM/SW for d/c planning


Additional orders as above


Code status: DNR/DNI


Possible d/c in 1-2 days

## 2017-10-22 NOTE — CR
Pelvis and right hip: AP view of the pelvis was obtained as well as AP

 and lateral views of the right hip.

 

Comparison: Previous pelvis and right hip study of 11/21/13.

 

Right hip prosthesis is seen.  Components are aligned.  Joint space 

narrowing and osteophytes are seen within the left hip.  Degenerative 

change is partially visualized within the lumbar spine.  Bony 

structures are osteopenic.  Heterotopic bone is noted around the 

lateral right hip.  No acute fracture or other abnormality is 

appreciated.

 

Impression:

1.  Right hip prosthesis.  Degenerative change and other incidental 

findings.

2.  No acute abnormality is identified on AP pelvis or on two-view 

right hip exam.

 

Diagnostic code #2

 

I agree with preliminary report issued by [employer] ([facility] 

preliminary report dictated on 10/21/17, 11:52 AM Central Time)

## 2017-10-23 RX ADMIN — Medication SCH MG: at 22:23

## 2017-10-23 RX ADMIN — CARBIDOPA AND LEVODOPA SCH TAB: 25; 100 TABLET, EXTENDED RELEASE ORAL at 09:23

## 2017-10-23 RX ADMIN — VITAMIN D, TAB 1000IU (100/BT) SCH UNITS: 25 TAB at 22:24

## 2017-10-23 RX ADMIN — ALUMINUM HYDROXIDE, MAGNESIUM HYDROXIDE, AND SIMETHICONE SCH: 200; 200; 20 SUSPENSION ORAL at 09:26

## 2017-10-23 RX ADMIN — CARBIDOPA AND LEVODOPA SCH TAB: 25; 100 TABLET, EXTENDED RELEASE ORAL at 22:27

## 2017-10-23 RX ADMIN — CARBIDOPA AND LEVODOPA SCH TAB: 25; 100 TABLET, EXTENDED RELEASE ORAL at 01:43

## 2017-10-23 RX ADMIN — CARBIDOPA AND LEVODOPA SCH TAB: 25; 100 TABLET, EXTENDED RELEASE ORAL at 12:36

## 2017-10-23 RX ADMIN — VITAMIN D, TAB 1000IU (100/BT) SCH UNITS: 25 TAB at 09:20

## 2017-10-23 RX ADMIN — Medication SCH MG: at 09:17

## 2017-10-23 RX ADMIN — CARBIDOPA AND LEVODOPA SCH: 25; 100 TABLET, EXTENDED RELEASE ORAL at 13:07

## 2017-10-23 RX ADMIN — CYANOCOBALAMIN TAB 1000 MCG SCH MCG: 1000 TAB at 09:19

## 2017-10-23 RX ADMIN — HYDRALAZINE HYDROCHLORIDE PRN MG: 20 INJECTION INTRAMUSCULAR; INTRAVENOUS at 12:36

## 2017-10-23 NOTE — PCM.PN
<Yesy Robert - Last Filed: 10/23/17 11:18>





- General Info


Date of Service: 10/23/17


Admission Dx/Problem (Free Text): 


 Admission Diagnosis/Problem





Admission Diagnosis/Problem      Pneumonia








Functional Status: Reports: Pain Controlled, Tolerating Diet, Ambulating.  

Denies: New Symptoms





- Review of Systems


General: Denies: Fever, Chills


HEENT: Reports: No Symptoms


Pulmonary: Reports: Cough.  Denies: Shortness of Breath, Sputum, Wheezing


Cardiovascular: Denies: Chest Pain, Palpitations, Dyspnea on Exertion


Gastrointestinal: Denies: Abdominal Pain, Decreased Appetite, Diarrhea, Melena, 

Nausea, Vomiting


Genitourinary: Denies: Dysuria, Frequency, Urgency


Musculoskeletal: Reports: Joint Pain (states that her left hip bothered was a 

little painful last night, but otherwise fine now.).  Denies: Back Pain, Joint 

Swelling


Skin: Denies: Bruising, Pruritis


Neurological: Reports: Tremors (hx of Parkinson's).  Denies: Confusion, 

Dizziness, Numbness, Paresthesia, Tingling, Change in Speech


Psychiatric: Denies: Confusion, Agitation, Hallucinations


Systems Review Comment:: 





Pleasant 84yoF, who states that she is feeling much better than yesterday. No 

major concerns/complaints at this time.





- Patient Data


Vitals - Most Recent: 


 Last Vital Signs











Temp  97.3 F   10/23/17 07:48


 


Pulse  58 L  10/23/17 07:50


 


Resp  18   10/23/17 07:48


 


BP  121/57 L  10/23/17 07:50


 


Pulse Ox  92 L  10/23/17 10:13











Weight - Most Recent: 69.127 kg


I&O - Last 24 Hours: 


 Intake & Output











 10/22/17 10/23/17 10/23/17





 22:59 06:59 14:59


 


Intake Total 1050 300 


 


Balance 1050 300 











Lab Results Last 24 Hours: 


 Laboratory Results - last 24 hr











  10/23/17 10/23/17 Range/Units





  05:55 05:55 


 


WBC  6.20   (3.98-10.04)  K/mm3


 


RBC  4.01   (3.98-5.22)  M/mm3


 


Hgb  12.6   (11.2-15.7)  gm/L


 


Hct  38.5   (34.1-44.9)  %


 


MCV  96.0 H   (79.4-94.8)  fl


 


MCH  31.4   (25.6-32.2)  pg


 


MCHC  32.7   (32.2-35.5)  g/dl


 


RDW Std Deviation  50.8 H   (36.4-46.3)  fL


 


Plt Count  115 L   (182-369)  K/mm3


 


MPV  10.3   (9.4-12.3)  fl


 


Neut % (Auto)  54.4   (34.0-71.1)  %


 


Lymph % (Auto)  26.6   (19.3-51.7)  %


 


Mono % (Auto)  8.7   (4.7-12.5)  %


 


Eos % (Auto)  9.7 H   (0.7-5.8)  


 


Baso % (Auto)  0.3   (0.1-1.2)  %


 


Neut # (Auto)  3.37   (1.56-6.13)  K/mm3


 


Lymph # (Auto)  1.65   (1.18-3.74)  K/mm3


 


Mono # (Auto)  0.54 H   (0.24-0.36)  K/mm3


 


Eos # (Auto)  0.60 H   (0.04-0.36)  K/mm3


 


Baso # (Auto)  0.02   (0.01-0.08)  K/mm3


 


Sodium   146 H  (136-145)  mEq/L


 


Potassium   4.1  (3.5-5.1)  mEq/L


 


Chloride   110 H  ()  mEq/L


 


Carbon Dioxide   25  (21-32)  mEq/L


 


Anion Gap   15.1 H  (5-15)  


 


BUN   29 H  (7-18)  mg/dL


 


Creatinine   1.0  (0.55-1.02)  mg/dL


 


Est Cr Clr Drug Dosing   37.68  mL/min


 


Estimated GFR (MDRD)   53  (>60)  mL/min


 


BUN/Creatinine Ratio   29.0 H  (14-18)  


 


Glucose   98  ()  mg/dL


 


Calcium   8.8  (8.5-10.1)  mg/dL


 


Magnesium   2.2  (1.8-2.4)  mg/dl


 


C-Reactive Protein   1.9 H*  (<1.0)  mg/dL











Med Orders - Current: 


 Current Medications





Acetaminophen (Tylenol)  650 mg PO Q4H PRN


   PRN Reason: Pain


Acetaminophen (Tylenol)  650 mg PO BID JESSY


   Last Admin: 10/23/17 09:20 Dose:  650 mg


Hydrocodone Bitart/Acetaminophen (Norco 325-5 Mg)  1 tab PO Q4H PRN


   PRN Reason: Pain (moderate 4-6)


Al Hydroxide/Mg Hydroxide (Mag-Al Plus)  10 ml PO DAILY [facility]


   Last Admin: 10/23/17 09:26 Dose:  Not Given


Albuterol/Ipratropium (Duoneb 3.0-0.5 Mg/3 Ml)  3 ml NEB Q4H PRN


   PRN Reason: Shortness Of Breath/wheezing


Aspirin (Halfprin)  81 mg PO DAILY [facility]


   Last Admin: 10/23/17 09:23 Dose:  81 mg


Bisacodyl (Dulcolax)  5 mg PO DAILY PRN


   PRN Reason: Constipation


Calcium Carbonate/Glycine (Tums)  500 mg PO DAILY [facility]


   Last Admin: 10/23/17 09:19 Dose:  500 mg


Calcium Carbonate/Glycine (Tums)  500 mg PO DAILY PRN


   PRN Reason: Heartburn


Carbidopa/Levodopa (Sinemet Cr  Mg)  2 tab PO Q6H [facility]


   Last Admin: 10/23/17 09:23 Dose:  2 tab


Carbidopa/Levodopa (Sinemet  Mg)  1 tab PO BIDAC [facility]


   Last Admin: 10/23/17 06:11 Dose:  1 tab


Cholecalciferol (Vitamin D3)  1,000 units PO BID [facility]


   Last Admin: 10/23/17 09:20 Dose:  1,000 units


Clonazepam (Klonopin)  0.5 mg PO QPM [facility]


   Last Admin: 10/22/17 18:00 Dose:  0.5 mg


Cyanocobalamin (Vitamin B12)  1,000 mcg PO DAILY [facility]


   Last Admin: 10/23/17 09:19 Dose:  1,000 mcg


Docusate Sodium (Colace)  100 mg PO BID PRN


   PRN Reason: Constipation


Donepezil HCl (Aricept)  10 mg PO BEDTIME [facility]


   Last Admin: 10/22/17 20:30 Dose:  10 mg


Enalapril Maleate (Vasotec)  5 mg PO DAILY [facility]


   Last Admin: 10/23/17 09:20 Dose:  5 mg


Gabapentin (Neurontin)  200 mg PO TID [facility]


   Last Admin: 10/23/17 09:19 Dose:  200 mg


Guaifenesin/Phenylephrine HCl (Robitussin Dm)  5 ml PO Q4H PRN


   PRN Reason: Cough


Hydralazine HCl (Apresoline)  10 mg IVPUSH Q4H PRN


   PRN Reason: Hypertension


   Last Admin: 10/22/17 12:31 Dose:  10 mg


Hydrocortisone (Hydrocortisone 1% Crm)  0 gm TOP BID PRN


   PRN Reason: Pain


Hydromorphone HCl (Dilaudid)  0.25 mg IVPUSH Q2H PRN


   PRN Reason: Pain (severe 7-10)


Azithromycin 500 mg/ Sodium (Chloride)  250 mls @ 250 mls/hr IV Q24H [facility]


   Last Admin: 10/22/17 16:34 Dose:  250 mls/hr


Ceftriaxone Sodium 1 gm/ (Sodium Chloride)  100 mls @ 200 mls/hr IV Q24H [facility]


   Last Admin: 10/22/17 12:06 Dose:  200 mls/hr


Lorazepam (Ativan)  0.5 mg IV Q6H PRN


   PRN Reason: Anxiety


Magnesium Sulfate (Pharmacy To Dose - Magnesium Replacement)  0 dose .XX 

ASDIRECTED PRN


   PRN Reason: RX TO WATCH MAG LEVELS


Memantine (Namenda)  10 mg PO BID [facility]


   Last Admin: 10/23/17 09:19 Dose:  10 mg


Metoprolol Tartrate (Lopressor)  12.5 mg PO BID [facility]


   Last Admin: 10/23/17 07:50 Dose:  12.5 mg


Metoprolol Tartrate (Lopressor)  5 mg IVPUSH Q4H PRN


   PRN Reason: Tachycardia


Naproxen (Naprosyn)  375 mg PO BID [facility]


   Last Admin: 10/23/17 09:21 Dose:  375 mg


Ondansetron HCl (Zofran)  4 mg IV Q6H PRN


   PRN Reason: Nausea/Vomiting


Oxybutynin Chloride (Oxybutynin)  2.5 mg PO BID [facility]


   Last Admin: 10/23/17 09:22 Dose:  2.5 mg


Pantoprazole Sodium (Protonix***)  40 mg PO DAILY [facility]


   Last Admin: 10/23/17 09:19 Dose:  40 mg


Polyethylene Glycol (Miralax)  17 gm PO DAILY PRN


   PRN Reason: Constipation


Polyethylene Glycol (Miralax)  17 gm PO DAILY [facility]


   Last Admin: 10/23/17 09:24 Dose:  17 gm


Potassium Chloride (Pharmacy To Dose - Potassium Replacement)  0 dose .XX 

ASDIRECTED PRN


   PRN Reason: RX TO WATCH K LEVELS


Quetiapine Fumarate (Seroquel)  12.5 mg PO BEDTIME [facility]


   Last Admin: 10/22/17 20:31 Dose:  12.5 mg


Ropinirole HCl (Requip)  4 mg PO BID [facility]


   Last Admin: 10/23/17 09:18 Dose:  4 mg


Saccharomyces Boulardii (Florastor)  500 mg PO BID [facility]


   Last Admin: 10/23/17 09:17 Dose:  500 mg


Senna (Senna)  8.6 mg PO BID [facility]


   Last Admin: 10/23/17 09:19 Dose:  8.6 mg


Senna/Docusate Sodium (Senna Plus)  1 tab PO BID PRN


   PRN Reason: Constipation


Sertraline HCl (Zoloft)  75 mg PO DAILY [facility]


   Last Admin: 10/23/17 09:18 Dose:  75 mg


Simethicone (Simethicone)  80 mg PO QID [facility]


   Last Admin: 10/23/17 09:19 Dose:  80 mg


Simvastatin (Zocor)  10 mg PO BEDTIME [facility]


   Last Admin: 10/22/17 20:33 Dose:  10 mg


Temazepam (Restoril)  7.5 mg PO BEDTIME PRN


   PRN Reason: Sleep





Discontinued Medications





Acetaminophen (Tylenol)  650 mg PO NOW ONE


   Stop: 10/21/17 13:36


   Last Admin: 10/21/17 13:39 Dose:  650 mg


Carbidopa/Levodopa (Sinemet  Mg)  1 tab PO DAILY@0600 [facility]


   Last Admin: 10/22/17 05:31 Dose:  1 tab


Ceftriaxone Sodium (Rocephin)  1,000 mg IVPUSH Q24H [facility]


   Last Admin: 10/21/17 13:22 Dose:  Not Given


Hydromorphone HCl (Dilaudid)  0.25 mg IVPUSH Q2H PRN


   PRN Reason: Pain (severe 7-10)


Ceftriaxone Sodium 1 gm/ (Sodium Chloride)  100 mls @ 200 mls/hr IV ONETIME ONE


   Stop: 10/21/17 13:39


   Last Admin: 10/21/17 13:18 Dose:  200 mls/hr


Azithromycin 500 mg/ Sodium (Chloride)  250 mls @ 250 mls/hr IV ONETIME ONE


   Stop: 10/21/17 17:10


   Last Admin: 10/21/17 17:23 Dose:  250 mls/hr


Labetalol HCl (Normodyne)  5 mg IVPUSH ONETIME ONE


   PRN Reason: Protocol


   Stop: 10/21/17 10:16


   Last Admin: 10/21/17 10:27 Dose:  5 mg


Labetalol HCl (Normodyne)  5 mg IVPUSH ONETIME ONE


   PRN Reason: Protocol


   Stop: 10/21/17 10:56


   Last Admin: 10/21/17 10:55 Dose:  5 mg











- Exam


Quality Assessment: Supplemental Oxygen (d/c o2 if SpO2 is >90% on RA)


General: Alert, Oriented, Cooperative, No Acute Distress


HEENT: Pupils Equal, Pupils Reactive, EOMI, Mucous Membr. Moist/Pink


Neck: Supple, Trachea Midline


Lungs: Clear to Auscultation, Normal Respiratory Effort, Decreased Breath 

Sounds.  No: Rhonchi, Wheezing


Cardiovascular: Regular Rate, Regular Rhythm, No Murmurs


GI/Abdominal Exam: Normal Bowel Sounds, Soft, Non-Tender, No Organomegaly, No 

Distention, No Mass


 (Female) Exam: Deferred


Back Exam: Normal Inspection


Extremities: Normal Inspection, Non-Tender, No Pedal Edema, Normal Capillary 

Refill


Peripheral Pulses: 2+: Radial (L), Radial (R), Dorsalis Pedis (L), Dorsalis 

Pedis (R)


Skin: Warm, Dry, Intact


Neurological: No New Focal Deficit


Psy/Mental Status: Alert, Normal Affect, Normal Mood





- Problem List Review


Problem List Initiated/Reviewed/Updated: Yes





- Plan


Plan:: 


Assessment/Plan:


Acute:


Bronchitis, Improved


   - CXR report done 10/21/2017 reads bronchial lower lobe thickening; no 

infiltrate or consolidations


   - She has cough but w/o fever    


   - Pneumonia is unlikely due to lack of fever or increased, colored sputum 

production and clear, but diminished BS on auscultation


   - Mycoplasma serum IgM is negative / Sputum for Cx and sensitivity-has not 

been collected yet


   - She was on 1LNC this AM with SpO2 ~94-95%, D/C O2 if SpO2 on RA is >90%, 


   - Possible home O2 eval needed if SpO2 <90% on RA.





Malignant HTN, Improved


   - Has hx/o HTN


   - Blood pressure readings of 199/94 and 197/96 mmHg on admission --> 121/57 

this AM


   - Resume Home BP Meds


   - PRN anti-hypertensive medications for BP > 140/90


   - Will add low dose Clonidine if needed


   - Continue to monitor





Mild Hypernatremia, Improved


   - --> 146


   - She is on psychotropic meds


   - Continue to monitor





S/p Status Post Fall - stable


   - Non-traumatic


   - She is not on blood thinners


   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


   - All imaging studies are negative: Right Hip XR, Head CT and Cervical CT 

scan  


   - Fall Precautions


   - PT/OT for eval - pending





Chronic:


Hypertension


Hyperlipidemia


GERD


Myasthenia gravis


Osteoarthritis


Malaise and Fatigue


Bone and cartilage disorder


Shortness of breath


Magnesium metabolism disorder


Hyperhidrosis


Restless leg syndrome


Anxiety


Depression


Paralysis Agitans/Parkinson's Disease





Plan:


She is clinically stable


Continue current treatment


Routine AM Labs, if not D/C'd home today


PT/OT consult


RT consult for possible Home O2 set-up


Fall Precautions


CM/SW for d/c planning


Additional orders as above


Code status: DNR/DNI


It is my clinical impression that she could be discharged to home today.











<Gagan Herrera - Last Filed: 10/23/17 18:54>





- General Info


Subjective Update: 


Follow Up





Functional Status: Reports: Pain Controlled, Tolerating Diet, Ambulating, 

Urinating.  Denies: New Symptoms





- Review of Systems


General: Denies: Fever, Fatigue, Malaise, Chills


HEENT: Reports: No Symptoms


Pulmonary: Reports: Cough.  Denies: Shortness of Breath, Sputum, Wheezing


Cardiovascular: Denies: Chest Pain, Palpitations, Dyspnea on Exertion


Gastrointestinal: Denies: Abdominal Pain, Decreased Appetite, Difficulty 

Swallowing, Nausea, Vomiting


Genitourinary: Reports: No Symptoms


Musculoskeletal: Reports: Joint Pain


Neurological: Reports: Tremors (resting), Difficulty Walking, Gait Disturbance.

  Denies: Confusion, Dizziness, Numbness, Tingling


Psychiatric: Denies: Confusion, Depression, Anxiety, Agitation, Hallucinations


Systems Review Comment:: 


No significant overnight or acute issues. She is about the same and no 

worsening of presenting symptoms. She is alert/awake and in no acute distress.








- Patient Data


Vitals - Most Recent: 


 Last Vital Signs











Temp  36.6 C   10/23/17 12:26


 


Pulse  61   10/23/17 12:26


 


Resp  18   10/23/17 12:26


 


BP  78/44 L  10/23/17 16:00


 


Pulse Ox  93 L  10/23/17 15:00











I&O - Last 24 Hours: 


 Intake & Output











 10/23/17 10/23/17 10/23/17





 06:59 14:59 22:59


 


Intake Total 300  940


 


Balance 300  940











Lab Results Last 24 Hours: 


 Laboratory Results - last 24 hr











  10/23/17 10/23/17 Range/Units





  05:55 05:55 


 


WBC  6.20   (3.98-10.04)  K/mm3


 


RBC  4.01   (3.98-5.22)  M/mm3


 


Hgb  12.6   (11.2-15.7)  gm/L


 


Hct  38.5   (34.1-44.9)  %


 


MCV  96.0 H   (79.4-94.8)  fl


 


MCH  31.4   (25.6-32.2)  pg


 


MCHC  32.7   (32.2-35.5)  g/dl


 


RDW Std Deviation  50.8 H   (36.4-46.3)  fL


 


Plt Count  115 L   (182-369)  K/mm3


 


MPV  10.3   (9.4-12.3)  fl


 


Neut % (Auto)  54.4   (34.0-71.1)  %


 


Lymph % (Auto)  26.6   (19.3-51.7)  %


 


Mono % (Auto)  8.7   (4.7-12.5)  %


 


Eos % (Auto)  9.7 H   (0.7-5.8)  


 


Baso % (Auto)  0.3   (0.1-1.2)  %


 


Neut # (Auto)  3.37   (1.56-6.13)  K/mm3


 


Lymph # (Auto)  1.65   (1.18-3.74)  K/mm3


 


Mono # (Auto)  0.54 H   (0.24-0.36)  K/mm3


 


Eos # (Auto)  0.60 H   (0.04-0.36)  K/mm3


 


Baso # (Auto)  0.02   (0.01-0.08)  K/mm3


 


Sodium   146 H  (136-145)  mEq/L


 


Potassium   4.1  (3.5-5.1)  mEq/L


 


Chloride   110 H  ()  mEq/L


 


Carbon Dioxide   25  (21-32)  mEq/L


 


Anion Gap   15.1 H  (5-15)  


 


BUN   29 H  (7-18)  mg/dL


 


Creatinine   1.0  (0.55-1.02)  mg/dL


 


Est Cr Clr Drug Dosing   37.68  mL/min


 


Estimated GFR (MDRD)   53  (>60)  mL/min


 


BUN/Creatinine Ratio   29.0 H  (14-18)  


 


Glucose   98  ()  mg/dL


 


Calcium   8.8  (8.5-10.1)  mg/dL


 


Magnesium   2.2  (1.8-2.4)  mg/dl


 


C-Reactive Protein   1.9 H*  (<1.0)  mg/dL











Med Orders - Current: 


 Current Medications





Acetaminophen (Tylenol)  650 mg PO Q4H PRN


   PRN Reason: Pain


Acetaminophen (Tylenol)  650 mg PO BID [facility]


   Last Admin: 10/23/17 09:20 Dose:  650 mg


Hydrocodone Bitart/Acetaminophen (Norco 325-5 Mg)  1 tab PO Q4H PRN


   PRN Reason: Pain (moderate 4-6)


Al Hydroxide/Mg Hydroxide (Mag-Al Plus)  10 ml PO DAILY [facility]


   Last Admin: 10/23/17 09:26 Dose:  Not Given


Albuterol/Ipratropium (Duoneb 3.0-0.5 Mg/3 Ml)  3 ml NEB Q4H PRN


   PRN Reason: Shortness Of Breath/wheezing


Aspirin (Halfprin)  81 mg PO DAILY [facility]


   Last Admin: 10/23/17 09:23 Dose:  81 mg


Bisacodyl (Dulcolax)  5 mg PO DAILY PRN


   PRN Reason: Constipation


Calcium Carbonate/Glycine (Tums)  500 mg PO DAILY [facility]


   Last Admin: 10/23/17 09:19 Dose:  500 mg


Calcium Carbonate/Glycine (Tums)  500 mg PO DAILY PRN


   PRN Reason: Heartburn


Carbidopa/Levodopa (Sinemet Cr  Mg)  2 tab PO Q6H [facility]


   Last Admin: 10/23/17 13:07 Dose:  Not Given


Carbidopa/Levodopa (Sinemet  Mg)  1 tab PO BIDAC [facility]


   Last Admin: 10/23/17 15:01 Dose:  1 tab


Cholecalciferol (Vitamin D3)  1,000 units PO BID [facility]


   Last Admin: 10/23/17 09:20 Dose:  1,000 units


Clonazepam (Klonopin)  0.5 mg PO QPM [facility]


   Last Admin: 10/23/17 17:59 Dose:  0.5 mg


Cyanocobalamin (Vitamin B12)  1,000 mcg PO DAILY [facility]


   Last Admin: 10/23/17 09:19 Dose:  1,000 mcg


Docusate Sodium (Colace)  100 mg PO BID PRN


   PRN Reason: Constipation


Donepezil HCl (Aricept)  10 mg PO BEDTIME [facility]


   Last Admin: 10/22/17 20:30 Dose:  10 mg


Enalapril Maleate (Vasotec)  5 mg PO DAILY [facility]


   Last Admin: 10/23/17 09:20 Dose:  5 mg


Gabapentin (Neurontin)  200 mg PO TID [facility]


   Last Admin: 10/23/17 15:01 Dose:  200 mg


Guaifenesin/Phenylephrine HCl (Robitussin Dm)  5 ml PO Q4H PRN


   PRN Reason: Cough


Hydralazine HCl (Apresoline)  10 mg IVPUSH Q4H PRN


   PRN Reason: Hypertension


   Last Admin: 10/23/17 12:36 Dose:  10 mg


Hydrocortisone (Hydrocortisone 1% Crm)  0 gm TOP BID PRN


   PRN Reason: Pain


Hydromorphone HCl (Dilaudid)  0.25 mg IVPUSH Q2H PRN


   PRN Reason: Pain (severe 7-10)


Azithromycin 500 mg/ Sodium (Chloride)  250 mls @ 250 mls/hr IV Q24H [facility]


   Last Admin: 10/23/17 15:01 Dose:  250 mls/hr


Ceftriaxone Sodium 1 gm/ (Dextrose/Water)  100 mls @ 200 mls/hr IV Q24H [facility]


   Last Admin: 10/23/17 12:36 Dose:  200 mls/hr


Lorazepam (Ativan)  0.5 mg IV Q6H PRN


   PRN Reason: Anxiety


Magnesium Sulfate (Pharmacy To Dose - Magnesium Replacement)  0 dose .XX 

ASDIRECTED PRN


   PRN Reason: RX TO WATCH MAG LEVELS


Memantine (Namenda)  10 mg PO BID [facility]


   Last Admin: 10/23/17 09:19 Dose:  10 mg


Metoprolol Tartrate (Lopressor)  12.5 mg PO BID [facility]


   Last Admin: 10/23/17 07:50 Dose:  12.5 mg


Metoprolol Tartrate (Lopressor)  5 mg IVPUSH Q4H PRN


   PRN Reason: Tachycardia


Naproxen (Naprosyn)  375 mg PO BID [facility]


   Last Admin: 10/23/17 09:21 Dose:  375 mg


Ondansetron HCl (Zofran)  4 mg IV Q6H PRN


   PRN Reason: Nausea/Vomiting


Oxybutynin Chloride (Oxybutynin)  2.5 mg PO BID [facility]


   Last Admin: 10/23/17 09:22 Dose:  2.5 mg


Pantoprazole Sodium (Protonix***)  40 mg PO DAILY [facility]


   Last Admin: 10/23/17 09:19 Dose:  40 mg


Polyethylene Glycol (Miralax)  17 gm PO DAILY PRN


   PRN Reason: Constipation


Polyethylene Glycol (Miralax)  17 gm PO DAILY [facility]


   Last Admin: 10/23/17 09:24 Dose:  17 gm


Potassium Chloride (Pharmacy To Dose - Potassium Replacement)  0 dose .XX 

ASDIRECTED PRN


   PRN Reason: RX TO WATCH K LEVELS


Quetiapine Fumarate (Seroquel)  12.5 mg PO BEDTIME [facility]


   Last Admin: 10/22/17 20:31 Dose:  12.5 mg


Ropinirole HCl (Requip)  4 mg PO BID [facility]


   Last Admin: 10/23/17 09:18 Dose:  4 mg


Saccharomyces Boulardii (Florastor)  500 mg PO BID [facility]


   Last Admin: 10/23/17 09:17 Dose:  500 mg


Senna (Senna)  8.6 mg PO BID [facility]


   Last Admin: 10/23/17 09:19 Dose:  8.6 mg


Senna/Docusate Sodium (Senna Plus)  1 tab PO BID PRN


   PRN Reason: Constipation


Sertraline HCl (Zoloft)  75 mg PO DAILY [facility]


   Last Admin: 10/23/17 09:18 Dose:  75 mg


Simethicone (Simethicone)  80 mg PO QID [facility]


   Last Admin: 10/23/17 17:59 Dose:  80 mg


Simvastatin (Zocor)  10 mg PO BEDTIME [facility]


   Last Admin: 10/22/17 20:33 Dose:  10 mg


Temazepam (Restoril)  7.5 mg PO BEDTIME PRN


   PRN Reason: Sleep





Discontinued Medications





Acetaminophen (Tylenol)  650 mg PO NOW ONE


   Stop: 10/21/17 13:36


   Last Admin: 10/21/17 13:39 Dose:  650 mg


Carbidopa/Levodopa (Sinemet  Mg)  1 tab PO DAILY@0600 [facility]


   Last Admin: 10/22/17 05:31 Dose:  1 tab


Ceftriaxone Sodium (Rocephin)  1,000 mg IVPUSH Q24H [facility]


   Last Admin: 10/21/17 13:22 Dose:  Not Given


Hydromorphone HCl (Dilaudid)  0.25 mg IVPUSH Q2H PRN


   PRN Reason: Pain (severe 7-10)


Ceftriaxone Sodium 1 gm/ (Sodium Chloride)  100 mls @ 200 mls/hr IV ONETIME ONE


   Stop: 10/21/17 13:39


   Last Admin: 10/21/17 13:18 Dose:  200 mls/hr


Azithromycin 500 mg/ Sodium (Chloride)  250 mls @ 250 mls/hr IV ONETIME ONE


   Stop: 10/21/17 17:10


   Last Admin: 10/21/17 17:23 Dose:  250 mls/hr


Ceftriaxone Sodium 1 gm/ (Sodium Chloride)  100 mls @ 200 mls/hr IV Q24H [facility]


   Last Admin: 10/22/17 12:06 Dose:  200 mls/hr


Labetalol HCl (Normodyne)  5 mg IVPUSH ONETIME ONE


   PRN Reason: Protocol


   Stop: 10/21/17 10:16


   Last Admin: 10/21/17 10:27 Dose:  5 mg


Labetalol HCl (Normodyne)  5 mg IVPUSH ONETIME ONE


   PRN Reason: Protocol


   Stop: 10/21/17 10:56


   Last Admin: 10/21/17 10:55 Dose:  5 mg











- Exam


General: Alert, Oriented, Cooperative, No Acute Distress


HEENT: Pupils Equal, Pupils Reactive, EOMI, Mucous Membr. Moist/Pink


Neck: Supple, Trachea Midline


Lungs: Clear to Auscultation, Normal Respiratory Effort, Decreased Breath 

Sounds.  No: Rhonchi, Wheezing


Cardiovascular: Regular Rate, Regular Rhythm, No Murmurs


GI/Abdominal Exam: Normal Bowel Sounds, Soft, Non-Tender, No Organomegaly, No 

Distention, No Mass


 (Female) Exam: Deferred


Back Exam: Normal Inspection, Decreased Range of Motion


Extremities: Normal Inspection, Non-Tender, No Pedal Edema, Normal Capillary 

Refill


Peripheral Pulses: 2+: Dorsalis Pedis (L), Dorsalis Pedis (R)


Skin: Warm, Dry, Intact


Neurological: No New Focal Deficit, Other (resting tremors and bradykinesia)


Psy/Mental Status: Alert, Normal Affect, Normal Mood





- Problem List Review


Problem List Initiated/Reviewed/Updated: Yes





- My Orders


Last 24 Hours: 


My Active Orders





10/23/17 08:11


PT Evaluation and Treatment [CONS] Routine 





10/23/17 08:12


OT Evaluation and Treatment [CONS] Routine 





10/23/17 13:00


cefTRIAXone [Rocephin] 1 gm   Dextrose 5% in Water 100 ml IV Q24H 





10/23/17 13:37


RT Evaluate for Home Oxygen [RC] Click to Edit 





10/23/17 18:24


Head wo Cont [CT] Stat 





10/24/17 05:11


BMP [BASIC METABOLIC PANEL,BMP] [CHEM] AM 


CBC WITH AUTO DIFF [HEME] AM 


CRP [C-REACTIVE PROTEIN] [CHEM] AM 


MG [MAGNESIUM] [CHEM] AM 





10/25/17 05:11


BMP [BASIC METABOLIC PANEL,BMP] [CHEM] AM 


CBC WITH AUTO DIFF [HEME] AM 


CRP [C-REACTIVE PROTEIN] [CHEM] AM 


MG [MAGNESIUM] [CHEM] AM 





10/26/17 05:11


CRP [C-REACTIVE PROTEIN] [CHEM] AM 














- Plan


Plan:: 


Assessment/Plan:


Acute:


Bronchitis, Improved


   - CXR report done 10/21/2017 reads bronchial lower lobe thickening; no 

infiltrate or consolidations


   - She has cough but w/o fever    


   - Pneumonia is unlikely due to lack of fever or increased, colored sputum 

production and clear, but diminished BS on auscultation


   - Mycoplasma serum IgM is negative / Sputum for Cx and sensitivity-has not 

been collected yet


   - She was on 1LNC this AM with SpO2 ~94-95%, D/C O2 if SpO2 on RA is >90%, 


   - Possible home O2 eval needed if SpO2 <90% on RA





Malignant HTN, Improved


   - Has hx/o HTN


   - Blood pressure readings of 199/94 and 197/96 mmHg on admission --> 121/57 

this AM


   - Resume Home BP Meds


   - PRN anti-hypertensive medications for BP > 140/90


   - Will add low dose Clonidine if needed


   - Continue to monitor





Mild Hypernatremia, Improved


   - --> 146


   - She is on psychotropic meds


   - Continue to monitor





S/p Status Post Fall 


   - Non-traumatic


   - She is not on blood thinners


   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


   - All imaging studies are negative: Right Hip XR, Head CT and Cervical CT 

scan  


   - Fall Precautions


   - PT/OT for eval - she requires 2 person assist 





Chronic:


Hypertension


Hyperlipidemia


GERD


Myasthenia gravis


Osteoarthritis


Malaise and Fatigue


Bone and cartilage disorder


Shortness of breath


Magnesium metabolism disorder


Hyperhidrosis


Restless leg syndrome


Anxiety


Depression


Paralysis Agitans/Parkinson's Disease





Plan:


She remains clinically stable


Continue current treatment


Routine AM Labs, if not d/c'd home today


Continue PT/OT 


RT consult for possible Home O2 set-up


Aspiration/Fall Precautions


CM/SW for d/c planning


Additional orders as above


Code status: DNR/DNI





Patient may not be safe to go back to [facility]. Recommend Rehab/SNF placement

, she requires 2 person assist.

## 2017-10-23 NOTE — CT
Head CT

 

Technique: Multiple axial sections through the brain were obtained.  

Intravenous contrast was not utilized.

 

Comparison: Prior head CT study of 10/21/17.

 

Findings: Ventricles along with basal cisterns and sulci over the 

convexities are mildly prominent.  Minimal diminished density is noted

 within the periventricular and subcortical white matter compatible 

with small vessel ischemic demyelination change.  No other abnormal 

parenchymal densities are seen.  No evidence of intracranial 

hemorrhage.  No midline shift or mass effect is seen.

 

Bone window settings were reviewed which shows no acute calvarial 

abnormality.  Visualized sinuses are clear.

 

Impression:

1.  Mild senescent change.  No acute intracranial abnormality is 

appreciated at this time.  No significant change is seen from prior 

noncontrast head CT study.

 

Diagnostic code #2

## 2017-10-24 RX ADMIN — CYANOCOBALAMIN TAB 1000 MCG SCH MCG: 1000 TAB at 09:20

## 2017-10-24 RX ADMIN — Medication SCH MG: at 09:19

## 2017-10-24 RX ADMIN — VITAMIN D, TAB 1000IU (100/BT) SCH UNITS: 25 TAB at 20:35

## 2017-10-24 RX ADMIN — Medication SCH MG: at 20:33

## 2017-10-24 RX ADMIN — CARBIDOPA AND LEVODOPA SCH TAB: 25; 100 TABLET, EXTENDED RELEASE ORAL at 09:24

## 2017-10-24 RX ADMIN — CARBIDOPA AND LEVODOPA SCH TAB: 25; 100 TABLET, EXTENDED RELEASE ORAL at 14:10

## 2017-10-24 RX ADMIN — CARBIDOPA AND LEVODOPA SCH TAB: 25; 100 TABLET, EXTENDED RELEASE ORAL at 20:35

## 2017-10-24 RX ADMIN — ALUMINUM HYDROXIDE, MAGNESIUM HYDROXIDE, AND SIMETHICONE SCH: 200; 200; 20 SUSPENSION ORAL at 11:16

## 2017-10-24 RX ADMIN — CARBIDOPA AND LEVODOPA SCH TAB: 25; 100 TABLET, EXTENDED RELEASE ORAL at 02:44

## 2017-10-24 RX ADMIN — VITAMIN D, TAB 1000IU (100/BT) SCH UNITS: 25 TAB at 09:23

## 2017-10-24 NOTE — PCM.PN
<Yesy Robert - Last Filed: 10/24/17 13:03>





- General Info


Date of Service: 10/24/17


Admission Dx/Problem (Free Text): 


 Admission Diagnosis/Problem





Admission Diagnosis/Problem      Pneumonia








Subjective Update: 


Follow Up





Functional Status: Reports: Pain Controlled, Tolerating Diet, Ambulating, 

Urinating.  Denies: New Symptoms





- Review of Systems


General: Denies: Fever, Fatigue, Chills


HEENT: Denies: Dysphasia, Headaches, Visual Changes


Pulmonary: Denies: Shortness of Breath, Cough, Wheezing


Cardiovascular: Denies: Chest Pain, Palpitations, Edema


Gastrointestinal: Denies: Abdominal Pain, Constipation, Diarrhea, Hematochezia, 

Nausea, Vomiting


Genitourinary: Denies: Dysuria


Musculoskeletal: Reports: Back Pain (left sacral/hip area)


Skin: Reports: No Symptoms


Neurological: Reports: Pre-Existing Deficit (hx/o Parkinson's), Gait 

Disturbance (parkinsonian gait).  Denies: Headache, Numbness, Tingling


Psychiatric: Denies: Confusion, Depression, Anxiety


Systems Review Comment:: 


Pleasant 84yoF, not complaining of any pain, and is generally comfortable. 

Daughter is present in room, they were concerned about going home to [facility] (assisted living) vs. placement in SNF. Pt and daughter amenable to 

either plan, depending on the pt's needs at this time.





- Patient Data


Vitals - Most Recent: 


 Last Vital Signs











Temp  98.2 F   10/24/17 12:47


 


Pulse  55 L  10/24/17 12:47


 


Resp  16   10/24/17 12:47


 


BP  118/52 L  10/24/17 12:47


 


Pulse Ox  91 L  10/24/17 12:47











Weight - Most Recent: 69.853 kg


I&O - Last 24 Hours: 


 Intake & Output











 10/23/17 10/24/17 10/24/17





 22:59 06:59 14:59


 


Intake Total 1450 200 


 


Output Total  450 


 


Balance 1450 -250 











Lab Results Last 24 Hours: 


 Laboratory Results - last 24 hr











  10/24/17 10/24/17 Range/Units





  06:05 06:05 


 


WBC  5.31   (3.98-10.04)  K/mm3


 


RBC  4.01   (3.98-5.22)  M/mm3


 


Hgb  12.6   (11.2-15.7)  gm/L


 


Hct  38.4   (34.1-44.9)  %


 


MCV  95.8 H   (79.4-94.8)  fl


 


MCH  31.4   (25.6-32.2)  pg


 


MCHC  32.8   (32.2-35.5)  g/dl


 


RDW Std Deviation  50.7 H   (36.4-46.3)  fL


 


Plt Count  121 L   (182-369)  K/mm3


 


MPV  10.6   (9.4-12.3)  fl


 


Neut % (Auto)  51.8   (34.0-71.1)  %


 


Lymph % (Auto)  28.6   (19.3-51.7)  %


 


Mono % (Auto)  9.8   (4.7-12.5)  %


 


Eos % (Auto)  9.0 H   (0.7-5.8)  


 


Baso % (Auto)  0.4   (0.1-1.2)  %


 


Neut # (Auto)  2.75   (1.56-6.13)  K/mm3


 


Lymph # (Auto)  1.52   (1.18-3.74)  K/mm3


 


Mono # (Auto)  0.52 H   (0.24-0.36)  K/mm3


 


Eos # (Auto)  0.48 H   (0.04-0.36)  K/mm3


 


Baso # (Auto)  0.02   (0.01-0.08)  K/mm3


 


Sodium   146 H  (136-145)  mEq/L


 


Potassium   3.9  (3.5-5.1)  mEq/L


 


Chloride   110 H  ()  mEq/L


 


Carbon Dioxide   24  (21-32)  mEq/L


 


Anion Gap   15.9 H  (5-15)  


 


BUN   31 H  (7-18)  mg/dL


 


Creatinine   0.9  (0.55-1.02)  mg/dL


 


Est Cr Clr Drug Dosing   41.87  mL/min


 


Estimated GFR (MDRD)   60  (>60)  mL/min


 


BUN/Creatinine Ratio   34.4 H  (14-18)  


 


Glucose   98  ()  mg/dL


 


Calcium   8.9  (8.5-10.1)  mg/dL


 


Magnesium   2.1  (1.8-2.4)  mg/dl


 


C-Reactive Protein   1.5 H*  (<1.0)  mg/dL











Med Orders - Current: 


 Current Medications





Acetaminophen (Tylenol)  650 mg PO Q4H PRN


   PRN Reason: Pain


Acetaminophen (Tylenol)  650 mg PO BID [facility]


   Last Admin: 10/24/17 09:20 Dose:  650 mg


Hydrocodone Bitart/Acetaminophen (Norco 325-5 Mg)  1 tab PO Q4H PRN


   PRN Reason: Pain (moderate 4-6)


Al Hydroxide/Mg Hydroxide (Mag-Al Plus)  10 ml PO DAILY [facility]


   Last Admin: 10/24/17 11:16 Dose:  Not Given


Albuterol/Ipratropium (Duoneb 3.0-0.5 Mg/3 Ml)  3 ml NEB Q4H PRN


   PRN Reason: Shortness Of Breath/wheezing


Aspirin (Halfprin)  81 mg PO DAILY [facility]


   Last Admin: 10/24/17 09:22 Dose:  81 mg


Bisacodyl (Dulcolax)  5 mg PO DAILY PRN


   PRN Reason: Constipation


Calcium Carbonate/Glycine (Tums)  500 mg PO DAILY [facility]


   Last Admin: 10/24/17 09:22 Dose:  500 mg


Calcium Carbonate/Glycine (Tums)  500 mg PO DAILY PRN


   PRN Reason: Heartburn


Carbidopa/Levodopa (Sinemet Cr  Mg)  2 tab PO Q6H [facility]


   Last Admin: 10/24/17 09:24 Dose:  2 tab


Carbidopa/Levodopa (Sinemet  Mg)  1 tab PO BIDAC [facility]


   Last Admin: 10/24/17 06:25 Dose:  1 tab


Cholecalciferol (Vitamin D3)  1,000 units PO BID [facility]


   Last Admin: 10/24/17 09:23 Dose:  1,000 units


Clonazepam (Klonopin)  0.5 mg PO QPM [facility]


   Last Admin: 10/23/17 17:59 Dose:  0.5 mg


Cyanocobalamin (Vitamin B12)  1,000 mcg PO DAILY [facility]


   Last Admin: 10/24/17 09:20 Dose:  1,000 mcg


Docusate Sodium (Colace)  100 mg PO BID PRN


   PRN Reason: Constipation


Donepezil HCl (Aricept)  10 mg PO BEDTIME [facility]


   Last Admin: 10/23/17 22:23 Dose:  10 mg


Enalapril Maleate (Vasotec)  5 mg PO DAILY [facility]


   Last Admin: 10/24/17 09:22 Dose:  5 mg


Gabapentin (Neurontin)  200 mg PO TID [facility]


   Last Admin: 10/24/17 09:25 Dose:  200 mg


Guaifenesin/Phenylephrine HCl (Robitussin Dm)  5 ml PO Q4H PRN


   PRN Reason: Cough


Hydralazine HCl (Apresoline)  10 mg IVPUSH Q4H PRN


   PRN Reason: Hypertension


   Last Admin: 10/23/17 12:36 Dose:  10 mg


Hydrocortisone (Hydrocortisone 1% Crm)  0 gm TOP BID PRN


   PRN Reason: Pain


Hydromorphone HCl (Dilaudid)  0.25 mg IVPUSH Q2H PRN


   PRN Reason: Pain (severe 7-10)


Lorazepam (Ativan)  0.5 mg IV Q6H PRN


   PRN Reason: Anxiety


Magnesium Sulfate (Pharmacy To Dose - Magnesium Replacement)  0 dose .XX 

ASDIRECTED PRN


   PRN Reason: RX TO WATCH MAG LEVELS


Memantine (Namenda)  10 mg PO BID [facility]


   Last Admin: 10/24/17 09:25 Dose:  10 mg


Metoprolol Tartrate (Lopressor)  12.5 mg PO BID [facility]


   Last Admin: 10/24/17 09:27 Dose:  12.5 mg


Metoprolol Tartrate (Lopressor)  5 mg IVPUSH Q4H PRN


   PRN Reason: Tachycardia


Naproxen (Naprosyn)  375 mg PO BID [facility]


   Last Admin: 10/24/17 09:24 Dose:  375 mg


Ondansetron HCl (Zofran)  4 mg IV Q6H PRN


   PRN Reason: Nausea/Vomiting


Oxybutynin Chloride (Oxybutynin)  2.5 mg PO BID [facility]


   Last Admin: 10/24/17 09:24 Dose:  2.5 mg


Pantoprazole Sodium (Protonix***)  40 mg PO DAILY [facility]


   Last Admin: 10/24/17 11:16 Dose:  40 mg


Polyethylene Glycol (Miralax)  17 gm PO DAILY PRN


   PRN Reason: Constipation


Polyethylene Glycol (Miralax)  17 gm PO DAILY [facility]


   Last Admin: 10/24/17 09:28 Dose:  17 gm


Potassium Chloride (Pharmacy To Dose - Potassium Replacement)  0 dose .XX 

ASDIRECTED PRN


   PRN Reason: RX TO WATCH K LEVELS


Quetiapine Fumarate (Seroquel)  12.5 mg PO BEDTIME [facility]


   Last Admin: 10/23/17 22:26 Dose:  12.5 mg


Ropinirole HCl (Requip)  4 mg PO BID [facility]


   Last Admin: 10/24/17 09:21 Dose:  4 mg


Saccharomyces Boulardii (Florastor)  500 mg PO BID [facility]


   Last Admin: 10/24/17 09:19 Dose:  500 mg


Senna (Senna)  8.6 mg PO BID [facility]


   Last Admin: 10/24/17 09:27 Dose:  8.6 mg


Senna/Docusate Sodium (Senna Plus)  1 tab PO BID PRN


   PRN Reason: Constipation


Sertraline HCl (Zoloft)  75 mg PO DAILY [facility]


   Last Admin: 10/24/17 09:26 Dose:  75 mg


Simethicone (Simethicone)  80 mg PO QID [facility]


   Last Admin: 10/24/17 09:25 Dose:  80 mg


Simvastatin (Zocor)  10 mg PO BEDTIME [facility]


   Last Admin: 10/23/17 22:25 Dose:  10 mg


Sodium Chloride (Saline Flush)  10 ml FLUSH ASDIRECTED PRN


   PRN Reason: Keep Vein Open


Temazepam (Restoril)  7.5 mg PO BEDTIME PRN


   PRN Reason: Sleep





Discontinued Medications





Acetaminophen (Tylenol)  650 mg PO NOW ONE


   Stop: 10/21/17 13:36


   Last Admin: 10/21/17 13:39 Dose:  650 mg


Carbidopa/Levodopa (Sinemet  Mg)  1 tab PO DAILY@0600 [facility]


   Last Admin: 10/22/17 05:31 Dose:  1 tab


Ceftriaxone Sodium (Rocephin)  1,000 mg IVPUSH Q24H [facility]


   Last Admin: 10/21/17 13:22 Dose:  Not Given


Hydromorphone HCl (Dilaudid)  0.25 mg IVPUSH Q2H PRN


   PRN Reason: Pain (severe 7-10)


Ceftriaxone Sodium 1 gm/ (Sodium Chloride)  100 mls @ 200 mls/hr IV ONETIME ONE


   Stop: 10/21/17 13:39


   Last Admin: 10/21/17 13:18 Dose:  200 mls/hr


Azithromycin 500 mg/ Sodium (Chloride)  250 mls @ 250 mls/hr IV ONETIME ONE


   Stop: 10/21/17 17:10


   Last Admin: 10/21/17 17:23 Dose:  250 mls/hr


Azithromycin 500 mg/ Sodium (Chloride)  250 mls @ 250 mls/hr IV Q24H [facility]


   Last Admin: 10/23/17 15:01 Dose:  250 mls/hr


Ceftriaxone Sodium 1 gm/ (Sodium Chloride)  100 mls @ 200 mls/hr IV Q24H [facility]


   Last Admin: 10/22/17 12:06 Dose:  200 mls/hr


Ceftriaxone Sodium 1 gm/ (Dextrose/Water)  100 mls @ 200 mls/hr IV Q24H [facility]


   Last Admin: 10/23/17 12:36 Dose:  200 mls/hr


Labetalol HCl (Normodyne)  5 mg IVPUSH ONETIME ONE


   PRN Reason: Protocol


   Stop: 10/21/17 10:16


   Last Admin: 10/21/17 10:27 Dose:  5 mg


Labetalol HCl (Normodyne)  5 mg IVPUSH ONETIME ONE


   PRN Reason: Protocol


   Stop: 10/21/17 10:56


   Last Admin: 10/21/17 10:55 Dose:  5 mg











- Exam


General: Alert, Oriented, Cooperative, No Acute Distress


HEENT: Pupils Equal, Pupils Reactive, EOMI, Mucous Membr. Moist/Pink


Neck: Supple, Trachea Midline


Lungs: Clear to Auscultation, Normal Respiratory Effort.  No: Rhonchi, Wheezing


Cardiovascular: Regular Rate.  No: No Murmurs


GI/Abdominal Exam: Normal Bowel Sounds, Soft, Non-Tender, No Organomegaly, No 

Distention, No Mass


 (Female) Exam: Deferred


Back Exam: Normal Inspection


Extremities: Normal Inspection, Non-Tender, No Pedal Edema, Normal Capillary 

Refill


Peripheral Pulses: 2+: Radial (L), Radial (R), Dorsalis Pedis (L), Dorsalis 

Pedis (R)


Skin: Warm, Dry, Intact


Neurological: No New Focal Deficit


Psy/Mental Status: Alert, Normal Affect, Normal Mood


Physical Findings Comments:: 


Pt with parkinson disease, there are some parkinsonian movements of legs on 

examination.





- Problem List Review


Problem List Initiated/Reviewed/Updated: Yes





- Plan


Plan:: 


Assessment/Plan:


Acute:


Bronchitis, Improved


   - CXR report done 10/21/2017 reads bronchial lower lobe thickening; no 

infiltrate or consolidations


   - She has cough but w/o fever    


   - Pneumonia is unlikely due to lack of fever or increased, colored sputum 

production and clear, but diminished BS on auscultation


   - Mycoplasma serum IgM is negative / Sputum for Cx and sensitivity-has not 

been collected yet


   





Malignant HTN, Improved


   - Has hx/o HTN


   - Blood pressure readings of 199/94 and 197/96 mmHg on admission --> 121/57 

this AM


   - Resume Home BP Meds


   - PRN anti-hypertensive medications for BP > 140/90


   - Continue to monitor





Mild Hypernatremia, Improved


   - --> 146


   - She is on psychotropic meds


   - Continue to monitor





S/p Status Post Fall 


   - Non-traumatic


   - She is not on blood thinners


   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


   - All imaging studies are negative: Right Hip XR, Head CT and Cervical CT 

scan  


   - Fall Precautions


   - PT/OT for eval - she requires 1-2 person assist due to Parkinson's 

progression





Chronic:


Hypertension


Hyperlipidemia


GERD


Myasthenia gravis


Osteoarthritis


Malaise and Fatigue


Bone and cartilage disorder


Shortness of breath


Magnesium metabolism disorder


Hyperhidrosis


Restless leg syndrome


Anxiety


Depression


Paralysis Agitans/Parkinson's Disease





Plan:


She remains clinically stable


Continue current treatment


Routine AM Labs


Continue PT/OT 


Aspiration/Fall Precautions


CM/SW for d/c planning


Additional orders as above


Code status: DNR/DNI





[facility] has declined her return to their facility. Pt will need Rehab/SNF 

placement, she requires 2 person assist. - see PT notes


Plan to D/C to SNF after they have evaluated the patient.








<Gagan Herrera T - Last Filed: 10/24/17 22:39>





- General Info


Functional Status: Reports: Pain Controlled, Tolerating Diet, Ambulating, 

Urinating.  Denies: New Symptoms





- Review of Systems


General: Reports: Weakness.  Denies: Fever, Fatigue, Chills


HEENT: Denies: Dysphasia, Headaches, Visual Changes


Pulmonary: Denies: Shortness of Breath, Wheezing


Cardiovascular: Denies: Chest Pain, Palpitations, Edema


Gastrointestinal: Denies: Abdominal Pain, Nausea, Vomiting


Genitourinary: Reports: No Symptoms


Skin: Reports: No Symptoms


Neurological: Reports: Pre-Existing Deficit, Weakness, Gait Disturbance


Psychiatric: Denies: Confusion, Depression, Anxiety, Hallucinations


Systems Review Comment:: 


No significant overnight or acute issues. She is about the same from yesterday. 

Her labs are fairly stable. She has no new complaints.








- Patient Data


Vitals - Most Recent: 


 Last Vital Signs











Temp  36.2 C   10/24/17 19:37


 


Pulse  65   10/24/17 20:35


 


Resp  14   10/24/17 19:37


 


BP  118/78   10/24/17 20:35


 


Pulse Ox  96   10/24/17 19:37











I&O - Last 24 Hours: 


 Intake & Output











 10/24/17 10/24/17 10/24/17





 06:59 14:59 22:59


 


Intake Total 200 163 


 


Output Total 450  200


 


Balance -250 163 -200











Lab Results Last 24 Hours: 


 Laboratory Results - last 24 hr











  10/24/17 10/24/17 Range/Units





  06:05 06:05 


 


WBC  5.31   (3.98-10.04)  K/mm3


 


RBC  4.01   (3.98-5.22)  M/mm3


 


Hgb  12.6   (11.2-15.7)  gm/L


 


Hct  38.4   (34.1-44.9)  %


 


MCV  95.8 H   (79.4-94.8)  fl


 


MCH  31.4   (25.6-32.2)  pg


 


MCHC  32.8   (32.2-35.5)  g/dl


 


RDW Std Deviation  50.7 H   (36.4-46.3)  fL


 


Plt Count  121 L   (182-369)  K/mm3


 


MPV  10.6   (9.4-12.3)  fl


 


Neut % (Auto)  51.8   (34.0-71.1)  %


 


Lymph % (Auto)  28.6   (19.3-51.7)  %


 


Mono % (Auto)  9.8   (4.7-12.5)  %


 


Eos % (Auto)  9.0 H   (0.7-5.8)  


 


Baso % (Auto)  0.4   (0.1-1.2)  %


 


Neut # (Auto)  2.75   (1.56-6.13)  K/mm3


 


Lymph # (Auto)  1.52   (1.18-3.74)  K/mm3


 


Mono # (Auto)  0.52 H   (0.24-0.36)  K/mm3


 


Eos # (Auto)  0.48 H   (0.04-0.36)  K/mm3


 


Baso # (Auto)  0.02   (0.01-0.08)  K/mm3


 


Sodium   146 H  (136-145)  mEq/L


 


Potassium   3.9  (3.5-5.1)  mEq/L


 


Chloride   110 H  ()  mEq/L


 


Carbon Dioxide   24  (21-32)  mEq/L


 


Anion Gap   15.9 H  (5-15)  


 


BUN   31 H  (7-18)  mg/dL


 


Creatinine   0.9  (0.55-1.02)  mg/dL


 


Est Cr Clr Drug Dosing   41.87  mL/min


 


Estimated GFR (MDRD)   60  (>60)  mL/min


 


BUN/Creatinine Ratio   34.4 H  (14-18)  


 


Glucose   98  ()  mg/dL


 


Calcium   8.9  (8.5-10.1)  mg/dL


 


Magnesium   2.1  (1.8-2.4)  mg/dl


 


C-Reactive Protein   1.5 H*  (<1.0)  mg/dL











Med Orders - Current: 


 Current Medications





Acetaminophen (Tylenol)  650 mg PO Q4H PRN


   PRN Reason: Pain


Acetaminophen (Tylenol)  650 mg PO BID [facility]


   Last Admin: 10/24/17 20:36 Dose:  650 mg


Hydrocodone Bitart/Acetaminophen (Norco 325-5 Mg)  1 tab PO Q4H PRN


   PRN Reason: Pain (moderate 4-6)


Al Hydroxide/Mg Hydroxide (Mag-Al Plus)  10 ml PO DAILY [facility]


   Last Admin: 10/24/17 11:16 Dose:  Not Given


Albuterol/Ipratropium (Duoneb 3.0-0.5 Mg/3 Ml)  3 ml NEB Q4H PRN


   PRN Reason: Shortness Of Breath/wheezing


Aspirin (Halfprin)  81 mg PO DAILY [facility]


   Last Admin: 10/24/17 09:22 Dose:  81 mg


Bisacodyl (Dulcolax)  5 mg PO DAILY PRN


   PRN Reason: Constipation


Calcium Carbonate/Glycine (Tums)  500 mg PO DAILY [facility]


   Last Admin: 10/24/17 09:22 Dose:  500 mg


Calcium Carbonate/Glycine (Tums)  500 mg PO DAILY PRN


   PRN Reason: Heartburn


Carbidopa/Levodopa (Sinemet Cr  Mg)  2 tab PO Q6H [facility]


   Last Admin: 10/24/17 20:35 Dose:  2 tab


Carbidopa/Levodopa (Sinemet  Mg)  1 tab PO BIDAC [facility]


   Last Admin: 10/24/17 16:25 Dose:  1 tab


Cholecalciferol (Vitamin D3)  1,000 units PO BID [facility]


   Last Admin: 10/24/17 20:35 Dose:  1,000 units


Clonazepam (Klonopin)  0.5 mg PO QPM [facility]


   Last Admin: 10/24/17 17:43 Dose:  0.5 mg


Cyanocobalamin (Vitamin B12)  1,000 mcg PO DAILY [facility]


   Last Admin: 10/24/17 09:20 Dose:  1,000 mcg


Docusate Sodium (Colace)  100 mg PO BID PRN


   PRN Reason: Constipation


Donepezil HCl (Aricept)  10 mg PO BEDTIME [facility]


   Last Admin: 10/24/17 20:34 Dose:  10 mg


Enalapril Maleate (Vasotec)  5 mg PO DAILY [facility]


   Last Admin: 10/24/17 09:22 Dose:  5 mg


Gabapentin (Neurontin)  200 mg PO TID [facility]


   Last Admin: 10/24/17 20:36 Dose:  200 mg


Guaifenesin/Phenylephrine HCl (Robitussin Dm)  5 ml PO Q4H PRN


   PRN Reason: Cough


Hydralazine HCl (Apresoline)  10 mg IVPUSH Q4H PRN


   PRN Reason: Hypertension


   Last Admin: 10/23/17 12:36 Dose:  10 mg


Hydrocortisone (Hydrocortisone 1% Crm)  0 gm TOP BID PRN


   PRN Reason: Pain


Hydromorphone HCl (Dilaudid)  0.25 mg IVPUSH Q2H PRN


   PRN Reason: Pain (severe 7-10)


Lorazepam (Ativan)  0.5 mg IV Q6H PRN


   PRN Reason: Anxiety


Magnesium Sulfate (Pharmacy To Dose - Magnesium Replacement)  0 dose .XX 

ASDIRECTED PRN


   PRN Reason: RX TO WATCH MAG LEVELS


Memantine (Namenda)  10 mg PO BID [facility]


   Last Admin: 10/24/17 20:33 Dose:  10 mg


Metoprolol Tartrate (Lopressor)  12.5 mg PO BID [facility]


   Last Admin: 10/24/17 20:35 Dose:  12.5 mg


Metoprolol Tartrate (Lopressor)  5 mg IVPUSH Q4H PRN


   PRN Reason: Tachycardia


Naproxen (Naprosyn)  375 mg PO BID [facility]


   Last Admin: 10/24/17 20:35 Dose:  375 mg


Ondansetron HCl (Zofran)  4 mg IV Q6H PRN


   PRN Reason: Nausea/Vomiting


Oxybutynin Chloride (Oxybutynin)  2.5 mg PO BID [facility]


   Last Admin: 10/24/17 20:35 Dose:  2.5 mg


Pantoprazole Sodium (Protonix***)  40 mg PO DAILY [facility]


   Last Admin: 10/24/17 11:16 Dose:  40 mg


Polyethylene Glycol (Miralax)  17 gm PO DAILY PRN


   PRN Reason: Constipation


Polyethylene Glycol (Miralax)  17 gm PO DAILY [facility]


   Last Admin: 10/24/17 09:28 Dose:  17 gm


Potassium Chloride (Pharmacy To Dose - Potassium Replacement)  0 dose .XX 

ASDIRECTED PRN


   PRN Reason: RX TO WATCH K LEVELS


Quetiapine Fumarate (Seroquel)  12.5 mg PO BEDTIME [facility]


   Last Admin: 10/24/17 20:35 Dose:  12.5 mg


Ropinirole HCl (Requip)  4 mg PO BID [facility]


   Last Admin: 10/24/17 20:33 Dose:  4 mg


Saccharomyces Boulardii (Florastor)  500 mg PO BID [facility]


   Last Admin: 10/24/17 20:33 Dose:  500 mg


Senna (Senna)  8.6 mg PO BID [facility]


   Last Admin: 10/24/17 20:33 Dose:  8.6 mg


Senna/Docusate Sodium (Senna Plus)  1 tab PO BID PRN


   PRN Reason: Constipation


Sertraline HCl (Zoloft)  75 mg PO DAILY [facility]


   Last Admin: 10/24/17 09:26 Dose:  75 mg


Simethicone (Simethicone)  80 mg PO QID [facility]


   Last Admin: 10/24/17 20:33 Dose:  80 mg


Simvastatin (Zocor)  10 mg PO BEDTIME [facility]


   Last Admin: 10/24/17 20:33 Dose:  10 mg


Sodium Chloride (Saline Flush)  10 ml FLUSH ASDIRECTED PRN


   PRN Reason: Keep Vein Open


Temazepam (Restoril)  7.5 mg PO BEDTIME PRN


   PRN Reason: Sleep





Discontinued Medications





Acetaminophen (Tylenol)  650 mg PO NOW ONE


   Stop: 10/21/17 13:36


   Last Admin: 10/21/17 13:39 Dose:  650 mg


Carbidopa/Levodopa (Sinemet  Mg)  1 tab PO DAILY@0600 [facility]


   Last Admin: 10/22/17 05:31 Dose:  1 tab


Ceftriaxone Sodium (Rocephin)  1,000 mg IVPUSH Q24H [facility]


   Last Admin: 10/21/17 13:22 Dose:  Not Given


Hydromorphone HCl (Dilaudid)  0.25 mg IVPUSH Q2H PRN


   PRN Reason: Pain (severe 7-10)


Ceftriaxone Sodium 1 gm/ (Sodium Chloride)  100 mls @ 200 mls/hr IV ONETIME ONE


   Stop: 10/21/17 13:39


   Last Admin: 10/21/17 13:18 Dose:  200 mls/hr


Azithromycin 500 mg/ Sodium (Chloride)  250 mls @ 250 mls/hr IV ONETIME ONE


   Stop: 10/21/17 17:10


   Last Admin: 10/21/17 17:23 Dose:  250 mls/hr


Azithromycin 500 mg/ Sodium (Chloride)  250 mls @ 250 mls/hr IV Q24H [facility]


   Last Admin: 10/23/17 15:01 Dose:  250 mls/hr


Ceftriaxone Sodium 1 gm/ (Sodium Chloride)  100 mls @ 200 mls/hr IV Q24H [facility]


   Last Admin: 10/22/17 12:06 Dose:  200 mls/hr


Ceftriaxone Sodium 1 gm/ (Dextrose/Water)  100 mls @ 200 mls/hr IV Q24H [facility]


   Last Admin: 10/23/17 12:36 Dose:  200 mls/hr


Labetalol HCl (Normodyne)  5 mg IVPUSH ONETIME ONE


   PRN Reason: Protocol


   Stop: 10/21/17 10:16


   Last Admin: 10/21/17 10:27 Dose:  5 mg


Labetalol HCl (Normodyne)  5 mg IVPUSH ONETIME ONE


   PRN Reason: Protocol


   Stop: 10/21/17 10:56


   Last Admin: 10/21/17 10:55 Dose:  5 mg











- Exam


General: Alert, Oriented, Cooperative, No Acute Distress


HEENT: Pupils Equal, Pupils Reactive, EOMI


Neck: Supple, Trachea Midline


Lungs: Clear to Auscultation, Normal Respiratory Effort


Cardiovascular: Regular Rate, Regular Rhythm


GI/Abdominal Exam: Normal Bowel Sounds, Non-Tender, No Organomegaly, No 

Distention, No Mass


 (Female) Exam: Deferred


Back Exam: Normal Inspection, Decreased Range of Motion


Extremities: Normal Inspection, Non-Tender, No Pedal Edema, Normal Capillary 

Refill


Peripheral Pulses: 2+: Dorsalis Pedis (L), Dorsalis Pedis (R)


Skin: Warm, Dry, Intact


Neurological: No New Focal Deficit


Psy/Mental Status: Alert, Normal Mood.  No: Normal Affect





- Problem List Review


Problem List Initiated/Reviewed/Updated: Yes





- My Orders


Last 24 Hours: 


My Active Orders





10/25/17 05:11


BMP [BASIC METABOLIC PANEL,BMP] [CHEM] AM 


CBC WITH AUTO DIFF [HEME] AM 


CRP [C-REACTIVE PROTEIN] [CHEM] AM 


MG [MAGNESIUM] [CHEM] AM 





10/26/17 05:11


CRP [C-REACTIVE PROTEIN] [CHEM] AM 














- Plan


Plan:: 


Assessment/Plan:


Acute:


Paralysis Agitans/Parkinson's Disease


   - Likely worsening at this point


   - Continue current treatment





Mild Hypernatremia, Stable


   - --> 146 


   - She is on psychotropic meds


   - Continue to monitor





S/p Status Post Fall 


   - Non-traumatic


   - She is not on blood thinners


   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


   - All imaging studies are negative: Right Hip XR, Head CT and Cervical CT 

scan  


   - Fall Precautions


   - PT/OT for eval - she requires 1-2 person assist due to Parkinson's 

progression





Bronchitis, Improved


   - CXR report done 10/21/2017 reads bronchial lower lobe thickening; no 

infiltrate or consolidations


   - She has cough but w/o fever    


   - Pneumonia is unlikely due to lack of fever or increased, colored sputum 

production and clear, but diminished BS on auscultation


   - Mycoplasma serum IgM is negative / Sputum for Cx and sensitivity-has not 

been collected yet


   - Discontinue IV ATB today





Resolved:   


Malignant HTN


   - Has hx/o HTN


   - Blood pressure readings of 199/94 and 197/96 mmHg on admission --> 121/57 

this AM


   - Resume Home BP Meds


   - PRN anti-hypertensive medications for BP > 140/90


   - Continue to monitor





Chronic:


Hypertension


Hyperlipidemia


GERD


Myasthenia gravis


Osteoarthritis


Malaise and Fatigue


Bone and cartilage disorder


Shortness of breath


Magnesium metabolism disorder


Hyperhidrosis


Restless leg syndrome


Anxiety


Depression





Plan:


She remains clinically stable


Continue current treatment


Routine AM Labs


Continue PT/OT 


Aspiration/Fall Precautions


CM/SW for d/c planning


Additional orders as above


Code status: DNR/DNI





[facility] has declined her return to their facility. Pt will need Rehab/SNF 

placement, she requires 2 person assist. - see PT notes


Plan to D/C to SNF after they have evaluated the patient. LOS anticipate > 96 

hrs for SNF/Rehab placement.

## 2017-10-25 VITALS — DIASTOLIC BLOOD PRESSURE: 66 MMHG | SYSTOLIC BLOOD PRESSURE: 111 MMHG

## 2017-10-25 RX ADMIN — ALUMINUM HYDROXIDE, MAGNESIUM HYDROXIDE, AND SIMETHICONE SCH: 200; 200; 20 SUSPENSION ORAL at 09:58

## 2017-10-25 RX ADMIN — Medication SCH MG: at 09:52

## 2017-10-25 RX ADMIN — CYANOCOBALAMIN TAB 1000 MCG SCH MCG: 1000 TAB at 09:48

## 2017-10-25 RX ADMIN — CARBIDOPA AND LEVODOPA SCH TAB: 25; 100 TABLET, EXTENDED RELEASE ORAL at 09:49

## 2017-10-25 RX ADMIN — VITAMIN D, TAB 1000IU (100/BT) SCH UNITS: 25 TAB at 09:49

## 2017-10-25 RX ADMIN — CARBIDOPA AND LEVODOPA SCH TAB: 25; 100 TABLET, EXTENDED RELEASE ORAL at 02:31

## 2017-10-25 NOTE — PCM.DCSUM1
**Discharge Summary





- Hospital Course


Brief History: This is an 84-year-old elderly white female with past medical 

history of Hypertension, Hyperlipidemia, GERD, Myasthenia Gravis, Osteoarthritis

, Chronic Malaise and Fatigue, Bone and Cartilage disorder, Shortness of Breath

, Magnesium Metabolism Disorder, Hyperhidrosis, Restless Leg Syndrome, Anxiety, 

Depression, and Paralysis Agitans/Parkinson's Disease who presents to emergency 

department for evaluation after suffering a  non traumatic fall at her assisted 

living facility. She was admitted for hypoxemia due to bronchitis.





- Discharge Data


Discharge Date: 10/25/17


Discharge Disposition: Home, Self-Care 01


Condition: Good





- Discharge Diagnosis/Problem(s)


(1) Parkinson's disease (tremor, stiffness, slow motion, unstable posture)


SNOMED Code(s): 77488717


   ICD Code: G20 - PARKINSON'S DISEASE   Status: Chronic   





(2) Bronchitis


SNOMED Code(s): 70390351


   ICD Code: J40 - BRONCHITIS, NOT SPECIFIED AS ACUTE OR CHRONIC   Status: 

Resolved   





(3) Fall


SNOMED Code(s): 1506388


   ICD Code: W19.XXXA - UNSPECIFIED FALL, INITIAL ENCOUNTER   Status: Resolved 

  


Qualifiers: 


   Encounter type: initial encounter   Qualified Code(s): W19.XXXA - 

Unspecified fall, initial encounter   





(4) Hypernatremia


SNOMED Code(s): 48919423


   ICD Code: E87.0 - HYPEROSMOLALITY AND HYPERNATREMIA   Status: Resolved   





(5) Malignant hypertension


SNOMED Code(s): 64387241


   ICD Code: I10 - ESSENTIAL (PRIMARY) HYPERTENSION   Status: Resolved   





(6) Hypoxemia requiring supplemental oxygen


SNOMED Code(s): 241522123


   ICD Code: R09.02 - HYPOXEMIA; Z99.81 - DEPENDENCE ON SUPPLEMENTAL OXYGEN   

Status: Resolved   





(7) At high risk for injury related to fall


SNOMED Code(s): 031285041


   ICD Code: Z91.89 - OTH PERSONAL RISK FACTORS, NOT ELSEWHERE CLASSIFIED   

Status: Chronic   





- Patient Summary/Data


Operative Procedure(s) Performed: None


Complications: None


Consults: 


 Consultations





10/23/17 08:11


PT Evaluation and Treatment [CONS] Routine 





10/23/17 08:12


OT Evaluation and Treatment [CONS] Routine 











Labs Pending at D/C: 


None





Recommended Follow-up Testing/Procedures: 


None





Planned Operative Procedure(s) after DC: None


Hospital Course: 


Patient was primarily admitted for evaluation of recent fall. All workup 

related to the fall to include imaging studies were benign. However she was 

found to be hypoxemic with a finding of bronchitis on chest x-ray. Patient 

received respiratory care, supplemental O2, antibiotics and bronchodilators to 

improve her symptom. The patient slowly improved on this regimen. The rest of 

her presenting illness immediately resolved with further treatment and 

supportive care.





Her hospital course was uncomplicated. However she was requiring more 

assistance with mobility and ambulation from baseline and therefore we 

recommended placement for rehabilitation or skilled nursing facility. Her 

family understood her Parkinson's disease was progressing and she could be very 

well nearing end stage at this point. 





Patient will be discharged to [facility] sometime today once transportation 

arrives. She is high risk fall and aspiration due to her advanced Parkinson's 

Disease.





On the day of discharge, Dr. Nielsen was called and updated regarding 

discharge care plans.








- Patient Instructions


Diet: Usual Diet as Tolerated


Activity: As Tolerated


Driving: Do Not Drive


Showering/Bathing: May Shower


Notify Provider of: Fever, Increased Pain, Nausea and/or Vomiting


Other/Special Instructions: - Please resume all home medications.  - You are 

high fall risk.  - Call or follow up with your family doctor right after 

discharge for any questions or concerns





- Discharge Plan


Home Medications: 


 Home Meds





Acetaminophen 650 mg PO BID 06/10/15 [History]


Acetaminophen 650 mg PO Q4H PRN 06/10/15 [History]


Carbidopa/Levodopa [Sinemet  mg] 1 tab PO BID 06/10/15 [History]


Carbidopa/Levodopa [Sinemet Cr  mg] 1 tab PO QID 06/10/15 [History]


Docusate Sodium 100 mg PO BID 06/10/15 [History]


Memantine HCl [Namenda Xr] 28 mg PO DAILY 06/10/15 [History]


Metoprolol Tartrate 12.5 mg PO BID 06/10/15 [History]


Omeprazole [Prilosec] 20 mg PO DAILY 06/10/15 [History]


Sertraline HCl [Zoloft] 75 mg PO DAILY 06/10/15 [History]


Simethicone [Gas-X] 80 mg PO WITHMEALSANDBED 06/10/15 [History]


clonazePAM [Klonopin] 0.5 mg PO BEDTIME 06/10/15 [History]


rOPINIRole HCl [Requip] 4 mg PO BID 06/10/15 [History]


Aspirin [Adult Low Dose Aspirin EC] 81 mg PO DAILY 07/04/16 [History]


Cholecalciferol (Vitamin D3) [Vitamin D3] 1,000 unit PO BID 07/04/16 [History]


Omega-3/DHA/Epa/Fish Oil [Fish Oil 1,000 mg Softgel] 1,000 mg PO BID 07/04/16 [

History]


Sennosides [Senna] 1 tab PO BID 07/04/16 [History]


Calcium Carbonate [Calcium] 500 mg PO DAILY 10/21/17 [History]


Cyanocobalamin (Vitamin B12) [Vitamin B12] 1,000 mcg PO DAILY 10/21/17 [History]


Donepezil [Aricept] 10 mg PO BEDTIME 10/21/17 [History]


Enalapril [Vasotec] 5 mg PO DAILY 10/21/17 [History]


Gabapentin [Neurontin] 200 mg PO TID 10/21/17 [History]


Naproxen Sodium [Aleve] 220 mg PO BID 10/21/17 [History]


Oxybutynin 2.5 mg PO BID 10/21/17 [History]


Polyethylene Glycol 3350 [MiraLAX] 17 g PO DAILY 10/21/17 [History]


QUEtiapine [SEROquel] 12.5 mg PO BEDTIME 10/21/17 [History]


Simvastatin [Zocor] 10 mg PO BEDTIME 10/21/17 [History]








Patient Handouts:  Hypoxemia, Parkinson Disease, Easy-to-Read, Hypertension, 

Easy-to-Read, Fall Prevention in the Home, Community-Acquired Pneumonia, Adult, 

Easy-to-Read


Referrals: 


Phill Nielsen MD [Primary Care Provider] - 





- Discharge Summary/Plan Comment


DC Time >30 min.: Yes (45 mins)


Discharge Summary/Plan Comment: 


Discharge to [facility]








- General Info


Date of Service: 10/25/17


Admission Dx/Problem (Free Text: 


 Admission Diagnosis/Problem





Admission Diagnosis/Problem      Pneumonia








Subjective Update: 


Follow Up





Functional Status: Reports: Pain Controlled, Tolerating Diet, Ambulating, 

Urinating.  Denies: New Symptoms





- Review of Systems


General: Reports: Weakness.  Denies: Fever, Malaise, Chills


HEENT: Reports: No Symptoms


Pulmonary: Denies: Shortness of Breath


Cardiovascular: Denies: Chest Pain


Gastrointestinal: Denies: Abdominal Pain, Nausea, Vomiting


Genitourinary: Reports: No Symptoms


Musculoskeletal: Reports: No Symptoms


Skin: Reports: No Symptoms


Neurological: Reports: Difficulty Walking, Weakness, Gait Disturbance.  Denies: 

Confusion


Psychiatric: Denies: Depression, Anxiety, Hallucinations


Systems Review Comment: 


No overnight or acute issues. She is relatively well. She has no new 

complaints. Her vitals are stable.








- Patient Data


Vitals - Most Recent: 


 Last Vital Signs











Temp  36.8 C   10/25/17 03:30


 


Pulse  55 L  10/25/17 03:30


 


Resp  16   10/25/17 03:30


 


BP  134/63   10/25/17 03:30


 


Pulse Ox  94 L  10/25/17 03:30











Weight - Most Recent: 69.808 kg


I&O - Last 24 hours: 


 Intake & Output











 10/24/17 10/25/17 10/25/17





 22:59 06:59 14:59


 


Intake Total 240 400 


 


Output Total 200  


 


Balance 40 400 











Lab Results - Last 24 hrs: 


 Laboratory Results - last 24 hr











  10/25/17 10/25/17 Range/Units





  06:00 06:00 


 


WBC  4.98   (3.98-10.04)  K/mm3


 


RBC  3.86 L   (3.98-5.22)  M/mm3


 


Hgb  12.2   (11.2-15.7)  gm/L


 


Hct  36.7   (34.1-44.9)  %


 


MCV  95.1 H   (79.4-94.8)  fl


 


MCH  31.6   (25.6-32.2)  pg


 


MCHC  33.2   (32.2-35.5)  g/dl


 


RDW Std Deviation  50.1 H   (36.4-46.3)  fL


 


Plt Count  110 L   (182-369)  K/mm3


 


MPV  10.9   (9.4-12.3)  fl


 


Neut % (Auto)  47.3   (34.0-71.1)  %


 


Lymph % (Auto)  35.7   (19.3-51.7)  %


 


Mono % (Auto)  8.2   (4.7-12.5)  %


 


Eos % (Auto)  8.4 H   (0.7-5.8)  


 


Baso % (Auto)  0.2   (0.1-1.2)  %


 


Neut # (Auto)  2.35   (1.56-6.13)  K/mm3


 


Lymph # (Auto)  1.78   (1.18-3.74)  K/mm3


 


Mono # (Auto)  0.41 H   (0.24-0.36)  K/mm3


 


Eos # (Auto)  0.42 H   (0.04-0.36)  K/mm3


 


Baso # (Auto)  0.01   (0.01-0.08)  K/mm3


 


Sodium   144  (136-145)  mEq/L


 


Potassium   3.9  (3.5-5.1)  mEq/L


 


Chloride   109 H  ()  mEq/L


 


Carbon Dioxide   24  (21-32)  mEq/L


 


Anion Gap   14.9  (5-15)  


 


BUN   31 H  (7-18)  mg/dL


 


Creatinine   0.9  (0.55-1.02)  mg/dL


 


Est Cr Clr Drug Dosing   41.87  mL/min


 


Estimated GFR (MDRD)   60  (>60)  mL/min


 


BUN/Creatinine Ratio   34.4 H  (14-18)  


 


Glucose   94  ()  mg/dL


 


Calcium   8.6  (8.5-10.1)  mg/dL


 


Magnesium   2.0  (1.8-2.4)  mg/dl


 


C-Reactive Protein   0.8  (<1.0)  mg/dL











Med Orders - Current: 


 Current Medications





Acetaminophen (Tylenol)  650 mg PO Q4H PRN


   PRN Reason: Pain


Acetaminophen (Tylenol)  650 mg PO BID [facility]


   Last Admin: 10/24/17 20:36 Dose:  650 mg


Hydrocodone Bitart/Acetaminophen (Norco 325-5 Mg)  1 tab PO Q4H PRN


   PRN Reason: Pain (moderate 4-6)


Al Hydroxide/Mg Hydroxide (Mag-Al Plus)  10 ml PO DAILY [facility]


   Last Admin: 10/24/17 11:16 Dose:  Not Given


Albuterol/Ipratropium (Duoneb 3.0-0.5 Mg/3 Ml)  3 ml NEB Q4H PRN


   PRN Reason: Shortness Of Breath/wheezing


Aspirin (Halfprin)  81 mg PO DAILY [facility]


   Last Admin: 10/24/17 09:22 Dose:  81 mg


Bisacodyl (Dulcolax)  5 mg PO DAILY PRN


   PRN Reason: Constipation


Calcium Carbonate/Glycine (Tums)  500 mg PO DAILY [facility]


   Last Admin: 10/24/17 09:22 Dose:  500 mg


Calcium Carbonate/Glycine (Tums)  500 mg PO DAILY PRN


   PRN Reason: Heartburn


Carbidopa/Levodopa (Sinemet Cr  Mg)  2 tab PO Q6H [facility]


   Last Admin: 10/25/17 02:31 Dose:  2 tab


Carbidopa/Levodopa (Sinemet  Mg)  1 tab PO BIDAC [facility]


   Last Admin: 10/25/17 06:54 Dose:  1 tab


Cholecalciferol (Vitamin D3)  1,000 units PO BID [facility]


   Last Admin: 10/24/17 20:35 Dose:  1,000 units


Clonazepam (Klonopin)  0.5 mg PO QPM [facility]


   Last Admin: 10/24/17 17:43 Dose:  0.5 mg


Cyanocobalamin (Vitamin B12)  1,000 mcg PO DAILY [facility]


   Last Admin: 10/24/17 09:20 Dose:  1,000 mcg


Docusate Sodium (Colace)  100 mg PO BID PRN


   PRN Reason: Constipation


Donepezil HCl (Aricept)  10 mg PO BEDTIME [facility]


   Last Admin: 10/24/17 20:34 Dose:  10 mg


Enalapril Maleate (Vasotec)  5 mg PO DAILY [facility]


   Last Admin: 10/24/17 09:22 Dose:  5 mg


Gabapentin (Neurontin)  200 mg PO TID [facility]


   Last Admin: 10/24/17 20:36 Dose:  200 mg


Guaifenesin/Phenylephrine HCl (Robitussin Dm)  5 ml PO Q4H PRN


   PRN Reason: Cough


Hydralazine HCl (Apresoline)  10 mg IVPUSH Q4H PRN


   PRN Reason: Hypertension


   Last Admin: 10/23/17 12:36 Dose:  10 mg


Hydrocortisone (Hydrocortisone 1% Crm)  0 gm TOP BID PRN


   PRN Reason: Pain


Hydromorphone HCl (Dilaudid)  0.25 mg IVPUSH Q2H PRN


   PRN Reason: Pain (severe 7-10)


Lorazepam (Ativan)  0.5 mg IV Q6H PRN


   PRN Reason: Anxiety


Magnesium Sulfate (Pharmacy To Dose - Magnesium Replacement)  0 dose .XX 

ASDIRECTED PRN


   PRN Reason: RX TO WATCH MAG LEVELS


Memantine (Namenda)  10 mg PO BID [facility]


   Last Admin: 10/24/17 20:33 Dose:  10 mg


Metoprolol Tartrate (Lopressor)  12.5 mg PO BID [facility]


   Last Admin: 10/24/17 20:35 Dose:  12.5 mg


Metoprolol Tartrate (Lopressor)  5 mg IVPUSH Q4H PRN


   PRN Reason: Tachycardia


Naproxen (Naprosyn)  375 mg PO BID [facility]


   Last Admin: 10/24/17 20:35 Dose:  375 mg


Ondansetron HCl (Zofran)  4 mg IV Q6H PRN


   PRN Reason: Nausea/Vomiting


Oxybutynin Chloride (Oxybutynin)  2.5 mg PO BID [facility]


   Last Admin: 10/24/17 20:35 Dose:  2.5 mg


Pantoprazole Sodium (Protonix***)  40 mg PO DAILY [facility]


   Last Admin: 10/24/17 11:16 Dose:  40 mg


Polyethylene Glycol (Miralax)  17 gm PO DAILY PRN


   PRN Reason: Constipation


Polyethylene Glycol (Miralax)  17 gm PO DAILY [facility]


   Last Admin: 10/24/17 09:28 Dose:  17 gm


Potassium Chloride (Pharmacy To Dose - Potassium Replacement)  0 dose .XX 

ASDIRECTED PRN


   PRN Reason: RX TO WATCH K LEVELS


Quetiapine Fumarate (Seroquel)  12.5 mg PO BEDTIME [facility]


   Last Admin: 10/24/17 20:35 Dose:  12.5 mg


Ropinirole HCl (Requip)  4 mg PO BID [facility]


   Last Admin: 10/24/17 20:33 Dose:  4 mg


Saccharomyces Boulardii (Florastor)  500 mg PO BID [facility]


   Last Admin: 10/24/17 20:33 Dose:  500 mg


Senna (Senna)  8.6 mg PO BID [facility]


   Last Admin: 10/24/17 20:33 Dose:  8.6 mg


Senna/Docusate Sodium (Senna Plus)  1 tab PO BID PRN


   PRN Reason: Constipation


Sertraline HCl (Zoloft)  75 mg PO DAILY [facility]


   Last Admin: 10/24/17 09:26 Dose:  75 mg


Simethicone (Simethicone)  80 mg PO QID [facility]


   Last Admin: 10/24/17 20:33 Dose:  80 mg


Simvastatin (Zocor)  10 mg PO BEDTIME [facility]


   Last Admin: 10/24/17 20:33 Dose:  10 mg


Sodium Chloride (Saline Flush)  10 ml FLUSH ASDIRECTED PRN


   PRN Reason: Keep Vein Open


Temazepam (Restoril)  7.5 mg PO BEDTIME PRN


   PRN Reason: Sleep





Discontinued Medications





Acetaminophen (Tylenol)  650 mg PO NOW ONE


   Stop: 10/21/17 13:36


   Last Admin: 10/21/17 13:39 Dose:  650 mg


Carbidopa/Levodopa (Sinemet  Mg)  1 tab PO DAILY@0600 [facility]


   Last Admin: 10/22/17 05:31 Dose:  1 tab


Ceftriaxone Sodium (Rocephin)  1,000 mg IVPUSH Q24H [facility]


   Last Admin: 10/21/17 13:22 Dose:  Not Given


Hydromorphone HCl (Dilaudid)  0.25 mg IVPUSH Q2H PRN


   PRN Reason: Pain (severe 7-10)


Ceftriaxone Sodium 1 gm/ (Sodium Chloride)  100 mls @ 200 mls/hr IV ONETIME ONE


   Stop: 10/21/17 13:39


   Last Admin: 10/21/17 13:18 Dose:  200 mls/hr


Azithromycin 500 mg/ Sodium (Chloride)  250 mls @ 250 mls/hr IV ONETIME ONE


   Stop: 10/21/17 17:10


   Last Admin: 10/21/17 17:23 Dose:  250 mls/hr


Azithromycin 500 mg/ Sodium (Chloride)  250 mls @ 250 mls/hr IV Q24H [facility]


   Last Admin: 10/23/17 15:01 Dose:  250 mls/hr


Ceftriaxone Sodium 1 gm/ (Sodium Chloride)  100 mls @ 200 mls/hr IV Q24H [facility]


   Last Admin: 10/22/17 12:06 Dose:  200 mls/hr


Ceftriaxone Sodium 1 gm/ (Dextrose/Water)  100 mls @ 200 mls/hr IV Q24H [facility]


   Last Admin: 10/23/17 12:36 Dose:  200 mls/hr


Labetalol HCl (Normodyne)  5 mg IVPUSH ONETIME ONE


   PRN Reason: Protocol


   Stop: 10/21/17 10:16


   Last Admin: 10/21/17 10:27 Dose:  5 mg


Labetalol HCl (Normodyne)  5 mg IVPUSH ONETIME ONE


   PRN Reason: Protocol


   Stop: 10/21/17 10:56


   Last Admin: 10/21/17 10:55 Dose:  5 mg











- Exam


General: Reports: Alert, Oriented, Cooperative, No Acute Distress, Other (slow 

movement)


HEENT: Reports: Pupils Equal, Pupils Reactive, EOMI, Mucous Membr. Moist/Pink


Neck: Reports: Supple, Trachea Midline, No JVD


Lungs: Reports: Clear to Auscultation, Normal Respiratory Effort


Cardiovascular: Reports: Regular Rate, Regular Rhythm


GI/Abdominal Exam: Normal Bowel Sounds, Soft, Non-Tender, No Organomegaly, No 

Distention, No Abnormal Bruit, No Mass, Pelvis Stable


 (Female) Exam: Deferred


Rectal (Female) Exam: Deferred


Back Exam: Reports: Normal Inspection, Decreased Range of Motion


Extremities: Normal Inspection, Normal Range of Motion, Non-Tender, No Pedal 

Edema, Normal Capillary Refill


Skin: Reports: Warm, Dry, Intact


Neurological: Reports: No New Focal Deficit.  Denies: Normal Gait


Psy/Mental Status: Reports: Alert, Normal Mood.  Denies: Normal Affect (flat 

affect)





*Q Meaningful Use (DIS)





- VTE *Q


VTE Criteria *Q: 








- Stroke *Q


Stroke Criteria *Q: 








- AMI *Q


AMI Criteria *Q:

## 2018-07-10 ENCOUNTER — HOSPITAL ENCOUNTER (EMERGENCY)
Dept: HOSPITAL 41 - JD.ED | Age: 83
LOS: 1 days | Discharge: NURSING, REHAB OR LONG-TERM CARE | End: 2018-07-11
Payer: MEDICARE
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
## 2018-07-10 DIAGNOSIS — K21.9: ICD-10-CM

## 2018-07-10 DIAGNOSIS — I10: ICD-10-CM

## 2018-07-10 DIAGNOSIS — Z79.82: ICD-10-CM

## 2018-07-10 DIAGNOSIS — S40.011A: ICD-10-CM

## 2018-07-10 DIAGNOSIS — Z79.899: ICD-10-CM
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
## 2018-07-10 DIAGNOSIS — S30.0XXA: Primary | ICD-10-CM

## 2018-07-10 DIAGNOSIS — E78.00: ICD-10-CM

## 2018-07-11 VITALS — DIASTOLIC BLOOD PRESSURE: 53 MMHG | SYSTOLIC BLOOD PRESSURE: 94 MMHG

## 2018-07-11 NOTE — CR
Pelvis: AP view of the pelvis was obtained as well as two additional 

views.

 

Joint space narrowing is seen within the left hip with osteophytes and

 degenerative cysts.  Right hip prosthesis is noted.  Degenerative 

change is seen within the visualized lower lumbar spine.  Slight 

heterotopic bone is noted around the right hip.  Osteopenia is 

present.  No acute fracture or other bony abnormality is seen.

 

Impression:

1.  Right hip prosthesis.  Degenerative change and osteopenia as noted

 above.

2.  Nothing acute is appreciated on pelvis study.

 

Diagnostic code #2

## 2018-07-11 NOTE — EDM.PDOC
ED HPI GENERAL MEDICAL PROBLEM





- General


Chief Complaint: Upper Extremity Injury/Pain


Stated Complaint: [facility]


Time Seen by Provider: 07/11/18 01:10





- History of Present Illness


INITIAL COMMENTS - FREE TEXT/NARRATIVE: 





85-year-old female presents emergency room brought in by EMS after falling at 

the nursing home. 





Patient had an unwitnessed fall from ground level landing on her right side and 

buttocks. This occurred shortly before arrival she complains of some pain in 

her shoulder on the right and around her pelvis. What was most concerning is 

initially they thought she had low blood pressure at the [facility] nursing [place] 

EMS found a normal blood pressure and her blood pressure has been normal here. 

Patient complains of only discomfort in the right shoulder and around the lower 

right pelvis she is not having any breathing difficulties no shortness of 

breath she denies hitting her head. She is not having any abdominal pain no 

nausea vomiting constipation or diarrhea.





- Related Data


 Allergies











Allergy/AdvReac Type Severity Reaction Status Date / Time


 


No Known Allergies Allergy   Verified 10/21/17 09:33











Home Meds: 


 Home Meds





Acetaminophen 650 mg PO BID 06/10/15 [History]


Acetaminophen 650 mg PO Q4H PRN 06/10/15 [History]


Carbidopa/Levodopa [Sinemet  mg] 1 tab PO BID 06/10/15 [History]


Carbidopa/Levodopa [Sinemet Cr  mg] 1 tab PO QID 06/10/15 [History]


Docusate Sodium 100 mg PO BID 06/10/15 [History]


Memantine HCl [Namenda Xr] 28 mg PO DAILY 06/10/15 [History]


Metoprolol Tartrate 12.5 mg PO BID 06/10/15 [History]


Omeprazole [Prilosec] 20 mg PO DAILY 06/10/15 [History]


Sertraline HCl [Zoloft] 75 mg PO DAILY 06/10/15 [History]


Simethicone [Gas-X] 80 mg PO WITHMEALSANDBED 06/10/15 [History]


clonazePAM [Klonopin] 0.5 mg PO BEDTIME 06/10/15 [History]


rOPINIRole HCl [Requip] 4 mg PO BID 06/10/15 [History]


Aspirin [Adult Low Dose Aspirin EC] 81 mg PO DAILY 07/04/16 [History]


Cholecalciferol (Vitamin D3) [Vitamin D3] 1,000 unit PO BID 07/04/16 [History]


Omega-3/DHA/Epa/Fish Oil [Fish Oil 1,000 mg Softgel] 1,000 mg PO BID 07/04/16 [

History]


Sennosides [Senna] 1 tab PO BID 07/04/16 [History]


Calcium Carbonate [Calcium] 500 mg PO DAILY 10/21/17 [History]


Cyanocobalamin (Vitamin B12) [Vitamin B12] 1,000 mcg PO DAILY 10/21/17 [History]


Donepezil [Aricept] 10 mg PO BEDTIME 10/21/17 [History]


Enalapril [Vasotec] 5 mg PO DAILY 10/21/17 [History]


Gabapentin [Neurontin] 200 mg PO TID 10/21/17 [History]


Naproxen Sodium [Aleve] 220 mg PO BID 10/21/17 [History]


Oxybutynin 2.5 mg PO BID 10/21/17 [History]


Polyethylene Glycol 3350 [MiraLAX] 17 g PO DAILY 10/21/17 [History]


QUEtiapine [SEROquel] 12.5 mg PO BEDTIME 10/21/17 [History]


Simvastatin [Zocor] 10 mg PO BEDTIME 10/21/17 [History]











Past Medical History


HEENT History: Reports: Impaired Vision


Other HEENT History: esophogeal reflux


Cardiovascular History: Reports: High Cholesterol, Hypertension


Respiratory History: Reports: SOB


Gastrointestinal History: Reports: Chronic Constipation


Genitourinary History: Reports: Urinary Incontinence


Other Genitourinary History: hyperhidrosis


OB/GYN History: Reports: Pregnancy


Other OB/GYN History: Stillborn birth


Musculoskeletal History: Reports: Osteoarthritis


Other Musculoskeletal History: cervical disc degeneration


Neurological History: Reports: Parkinson's


Other Neuro History: unsteady gait, altered mental status, RLS, myestenia gravis


Psychiatric History: Reports: Anxiety, Depression





- Past Surgical History


Musculoskeletal Surgical History: Reports: Hip Replacement





Social & Family History





- Family History


Family Medical History: Noncontributory


Neurological: Reports: Parkinson's


Endocrine/Metabolic: Reports: [place]'s Disease, Diabetes, type II





- Tobacco Use


Smoking Status *Q: Never Smoker





- Caffeine Use


Caffeine Use: Reports: None


Caffeine Use Comment: Drinks one cup of coffee with breakfast, soda once a week





- Recreational Drug Use


Recreational Drug Use: No





- Living Situation & Occupation


Living situation: Reports: Assisted Living


Occupation: Retired





Review of Systems





- Review of Systems


Review Of Systems: See Below


Constitutional: Reports: No Symptoms


Ears: Reports: No Symptoms


Nose: Reports: No Symptoms


Mouth/Throat: Reports: No Symptoms


Respiratory: Reports: No Symptoms


Cardiovascular: Reports: No Symptoms


GI/Abdominal: Reports: No Symptoms


Genitourinary: Reports: No Symptoms


Musculoskeletal: Reports: No Symptoms


Skin: Reports: No Symptoms


Neurological: Reports: No Symptoms


Psychiatric: Reports: No Symptoms





ED EXAM, GENERAL





- Physical Exam


Exam: See Below


Exam Limited By: No Limitations


General Appearance: Alert, No Apparent Distress


Eye Exam: Bilateral Eye: EOMI, Normal Inspection


Ears: Normal External Exam, Normal Canal, Hearing Grossly Normal, Normal TMs


Nose: Normal Inspection, Normal Mucosa, No Blood


Throat/Mouth: Normal Inspection, Normal Lips, Normal Gums, Normal Oropharynx, 

Normal Voice, No Airway Compromise


Head: Atraumatic, Normocephalic


Neck: Normal Inspection, Supple, Non-Tender, Full Range of Motion.  No: 

Lymphadenopathy (L), Lymphadenopathy (R), Tender Midline


Respiratory/Chest: No Respiratory Distress, Lungs Clear, Normal Breath Sounds


Cardiovascular: Regular Rate, Rhythm, No Edema, No Murmur


GI/Abdominal: Normal Bowel Sounds, Soft, Non-Tender


Back Exam: Normal Inspection.  No: CVA Tenderness (L), CVA Tenderness (R), 

Vertebral Tenderness


Extremities: Normal Inspection, Normal Range of Motion, No Pedal Edema, Other (

She has some discomfort in the right pelvis there is no instability palpated)


Neurological: Other (No change from baseline)


Lymphatic: No Adenopathy





Course





- Vital Signs


Last Recorded V/S: 


 Last Vital Signs











Temp  36.3 C   07/10/18 23:54


 


Pulse  53 L  07/11/18 02:04


 


Resp  18   07/11/18 01:09


 


BP  94/53 L  07/11/18 02:04


 


Pulse Ox  97   07/11/18 01:09














- Orders/Labs/Meds


Orders: 


 Active Orders 24 hr











 Category Date Time Status


 


 Pelvis Min 3V [CR] Stat Exams  07/11/18 01:16 Ordered


 


 Shoulder Comp Rt [CR] Stat Exams  07/11/18 01:16 Ordered














- Re-Assessments/Exams


Free Text/Narrative Re-Assessment/Exam: 





07/11/18 02:42


X-rays of the pelvis are unremarkable x-ray of the right shoulder unremarkable 

she has shoulder discomfort we will not immobilize this as to prevent or limit 

adhesive capsulitis. She'll be discharged back to the skilled nursing facility





Departure





- Departure


Time of Disposition: 02:43


Disposition: DC/Tfer to [facility]


Clinical Impression: 


 Contusion of pelvis, Contusion of right shoulder








- Discharge Information


Referrals: 


Rojelio Hagen MD [Primary Care Provider] - 


Forms:  ED Department Discharge


Additional Instructions: 


Return to the emergency room with any questions problems worsening symptoms.





Tylenol as needed for discomfort





- My Orders


Last 24 Hours: 


My Active Orders





07/11/18 01:16


Pelvis Min 3V [CR] Stat 


Shoulder Comp Rt [CR] Stat 














- Assessment/Plan


Last 24 Hours: 


My Active Orders





07/11/18 01:16


Pelvis Min 3V [CR] Stat 


Shoulder Comp Rt [CR] Stat

## 2018-07-11 NOTE — CR
Right shoulder: Three views of the right shoulder were obtained.

 

Comparison: No prior right shoulder study.

 

Minimal degenerative change is noted within the acromioclavicular 

joint without abnormal inferior spurring.  Glenohumeral joint appears 

to be maintained.  No fracture or dislocation is seen.  Questionable 

chronic rotator cuff tear is present.

 

Impression:

1.  Questionable chronic rotator cuff tear.  Other incidental 

findings.

2.  Nothing acute is seen.

 

Diagnostic code #2

## 2018-09-29 ENCOUNTER — HOSPITAL ENCOUNTER (EMERGENCY)
Dept: HOSPITAL 41 - JD.ED | Age: 83
Discharge: HOME | End: 2018-09-29
Payer: MEDICARE

## 2018-09-29 VITALS — DIASTOLIC BLOOD PRESSURE: 63 MMHG | SYSTOLIC BLOOD PRESSURE: 119 MMHG

## 2018-09-29 DIAGNOSIS — E78.00: ICD-10-CM

## 2018-09-29 DIAGNOSIS — K21.9: ICD-10-CM

## 2018-09-29 DIAGNOSIS — Z13.9: Primary | ICD-10-CM

## 2018-09-29 DIAGNOSIS — Z79.899: ICD-10-CM

## 2018-09-29 DIAGNOSIS — I10: ICD-10-CM

## 2018-09-29 DIAGNOSIS — F32.9: ICD-10-CM

## 2018-09-29 DIAGNOSIS — F41.9: ICD-10-CM

## 2018-09-29 DIAGNOSIS — Z79.82: ICD-10-CM

## 2018-09-29 NOTE — EDM.PDOC
ED HPI GENERAL MEDICAL PROBLEM





- General


Chief Complaint: Cardiovascular Problem


Stated Complaint: ZAMZAM


Time Seen by Provider: 09/29/18 17:51


Source of Information: Reports: EMS, Nursing Home Records


History Limitations: Reports: Altered Mental Status





- History of Present Illness


INITIAL COMMENTS - FREE TEXT/NARRATIVE: 





84 y/o F with hx dementia, parkinson's, presents from nursing [facility] ([facility]) 

for report of low blood pressure. She seemed sleepier than usual so nurses 

checked a blood pressure and it was "low". Nursing staff later though error may 

have been cuff problem with automatic cuff. No report of recent illness. ED 

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 

home and knows [facility] well, states she appears at her baseline. No known 

fever. No additional complaint. No known recent medication changes. No known 

trauma. Elodia is unable to meaningfully answer questions. 


Treatments PTA: Reports: IV/IO





- Related Data


 Allergies











Allergy/AdvReac Type Severity Reaction Status Date / Time


 


No Known Allergies Allergy   Verified 09/29/18 17:48











Home Meds: 


 Home Meds





Acetaminophen 650 mg PO BID 06/10/15 [History]


Acetaminophen 650 mg PO Q4H PRN 06/10/15 [History]


Carbidopa/Levodopa [Sinemet  mg] 1 tab PO BID 06/10/15 [History]


Carbidopa/Levodopa [Sinemet Cr  mg] 1 tab PO QID 06/10/15 [History]


Metoprolol Tartrate 12.5 mg PO BID 06/10/15 [History]


Sertraline HCl [Zoloft] 100 mg PO DAILY 06/10/15 [History]


Simethicone [Gas-X] 80 mg PO WITHMEALSANDBED 06/10/15 [History]


clonazePAM [Klonopin] 0.25 mg PO BEDTIME 06/10/15 [History]


rOPINIRole HCl [Requip] 4 mg PO BID 06/10/15 [History]


Aspirin [Adult Low Dose Aspirin EC] 81 mg PO DAILY 07/04/16 [History]


Cholecalciferol (Vitamin D3) [Vitamin D3] 2,000 unit PO DAILY 07/04/16 [History]


Omega-3/DHA/Epa/Fish Oil [Fish Oil 1,000 mg Softgel] 1,000 mg PO BID 07/04/16 [

History]


Sennosides [Senna] 1 tab PO BID 07/04/16 [History]


Calcium Carbonate [Calcium] 500 mg PO DAILY 10/21/17 [History]


Cyanocobalamin (Vitamin B12) [Vitamin B12] 1,000 mcg PO DAILY 10/21/17 [History]


Donepezil [Aricept] 10 mg PO BEDTIME 10/21/17 [History]


Enalapril [Vasotec] 5 mg PO DAILY 10/21/17 [History]


Gabapentin [Neurontin] 200 mg PO TID 10/21/17 [History]


Oxybutynin 2.5 mg PO BID 10/21/17 [History]


Polyethylene Glycol 3350 [MiraLAX] 17 g PO DAILY 10/21/17 [History]


Simvastatin [Zocor] 10 mg PO BEDTIME 10/21/17 [History]


Bisacodyl [Biscolax] 10 mg RECTAL DAILY PRN 08/01/18 [History]


Bisacodyl [Dulcolax] 10 mg PO DAILY 08/01/18 [History]


Calcium Carbonate [Tums] 400 mg PO DAILY PRN 08/01/18 [History]


Dextran 70/Hypromellose [Artificial Tears] 1 drop EYEBOTH BID 08/01/18 [History]


Naproxen Sodium [Aleve] 220 mg PO BID 08/01/18 [History]


QUEtiapine [SEROquel] 50 mg PO DAILY 08/01/18 [History]


Ranitidine [Zantac] 150 mg PO BID PRN 08/01/18 [History]


traMADol [Ultram] 50 mg PO BID 08/01/18 [History]











Past Medical History


HEENT History: Reports: Impaired Vision


Other HEENT History: esophogeal reflux


Cardiovascular History: Reports: High Cholesterol, Hypertension


Other Cardiovascular History: edema


Respiratory History: Reports: SOB


Gastrointestinal History: Reports: Chronic Constipation, GERD


Genitourinary History: Reports: Urinary Incontinence


Other Genitourinary History: hyperhidrosis


OB/GYN History: Reports: Pregnancy


Other OB/GYN History: Stillborn birth


Musculoskeletal History: Reports: Osteoarthritis, Other (See Below)


Other Musculoskeletal History: cervical disc degeneration, chronic pain, 

restless legs syndrome


Neurological History: Reports: Parkinson's


Other Neuro History: unsteady gait, altered mental status, RLS, myestenia gravis


Psychiatric History: Reports: Anxiety, Dementia, Depression, Mood Swings, 

Psychosis


Endocrine/Metabolic History: Reports: Other (See Below)


Other Endocrine/Metabolic History: hypomagnesmia





- Past Surgical History


Musculoskeletal Surgical History: Reports: Hip Replacement





Social & Family History





- Family History


Family Medical History: Noncontributory


Neurological: Reports: Parkinson's


Endocrine/Metabolic: Reports: Dayton's Disease, Diabetes, type II





- Tobacco Use


Smoking Status *Q: Unknown Ever Smoked





- Caffeine Use


Caffeine Use: Reports: Coffee


Caffeine Use Comment: Drinks one cup of coffee with breakfast, soda once a week





- Recreational Drug Use


Recreational Drug Use: No





- Living Situation & Occupation


Living situation: Reports: Extended Care Facility (Living in [facility]), Assisted Living


Occupation: Retired





ED ROS GENERAL





- Review of Systems


Review Of Systems: ROS reveals no pertinent complaints other than HPI.


Constitutional: Denies: Fever


Respiratory: Reports: No Symptoms


Cardiovascular: Reports: Blood Pressure Problem


Endocrine: Reports: No Symptoms


GI/Abdominal: Reports: No Symptoms


Neurological: Denies: Change in Speech





ED EXAM, GENERAL





- Physical Exam


Exam: See Below


Exam Limited By: No Limitations


General Appearance: Alert, WD/WN, No Apparent Distress


Eye Exam: Bilateral Eye: Normal Inspection


Ears: Normal External Exam


Nose: Normal Inspection, Normal Mucosa, No Blood


Throat/Mouth: Normal Inspection, Normal Voice, Other (dry MM)


Head: Atraumatic, Normocephalic


Neck: Normal Inspection


Respiratory/Chest: No Respiratory Distress, Lungs Clear, Normal Breath Sounds


Cardiovascular: Normal Peripheral Pulses, Regular Rate, Rhythm, No Edema


GI/Abdominal: Soft, Non-Tender, No Distention.  No: Rebound


Back Exam: Normal Inspection


Extremities: Normal Inspection


Neurological: Alert, CN II-XII Intact, No Motor/Sensory Deficits


Psychiatric: Normal Affect


Skin Exam: Warm, Dry, Intact, Normal Color, No Rash





Course





- Vital Signs


Last Recorded V/S: 


 Last Vital Signs











Temp  36.3 C   09/29/18 17:37


 


Pulse  58 L  09/29/18 17:37


 


Resp  14   09/29/18 17:37


 


BP  119/63   09/29/18 17:37


 


Pulse Ox  96   09/29/18 17:37














- Orders/Labs/Meds


Labs: 


 Laboratory Tests











  09/29/18 09/29/18 Range/Units





  18:15 18:15 


 


WBC  5.19   (3.98-10.04)  K/mm3


 


RBC  3.99   (3.98-5.22)  M/mm3


 


Hgb  13.2   (11.2-15.7)  gm/L


 


Hct  39.4   (34.1-44.9)  %


 


MCV  98.7 H   (79.4-94.8)  fl


 


MCH  33.1 H   (25.6-32.2)  pg


 


MCHC  33.5   (32.2-35.5)  g/dl


 


RDW Std Deviation  47.1 H   (36.4-46.3)  fL


 


Plt Count  126 L   (182-369)  K/mm3


 


MPV  11.0   (9.4-12.3)  fl


 


Neut % (Auto)  58.5   (34.0-71.1)  %


 


Lymph % (Auto)  30.1   (19.3-51.7)  %


 


Mono % (Auto)  7.9   (4.7-12.5)  %


 


Eos % (Auto)  3.1   (0.7-5.8)  


 


Baso % (Auto)  0.2   (0.1-1.2)  %


 


Neut # (Auto)  3.04   (1.56-6.13)  K/mm3


 


Lymph # (Auto)  1.56   (1.18-3.74)  K/mm3


 


Mono # (Auto)  0.41 H   (0.24-0.36)  K/mm3


 


Eos # (Auto)  0.16   (0.04-0.36)  K/mm3


 


Baso # (Auto)  0.01   (0.01-0.08)  K/mm3


 


Sodium   140  (136-145)  mEq/L


 


Potassium   4.4  (3.5-5.1)  mEq/L


 


Chloride   104  ()  mEq/L


 


Carbon Dioxide   28  (21-32)  mEq/L


 


Anion Gap   12.4  (5-15)  


 


BUN   37 H  (7-18)  mg/dL


 


Creatinine   1.3 H  (0.55-1.02)  mg/dL


 


Est Cr Clr Drug Dosing   TNP  


 


Estimated GFR (MDRD)   39  (>60)  mL/min


 


BUN/Creatinine Ratio   28.5 H  (14-18)  


 


Glucose   102  ()  mg/dL


 


Calcium   9.6  (8.5-10.1)  mg/dL


 


Total Bilirubin   0.6  (0.2-1.0)  mg/dL


 


AST   14 L  (15-37)  U/L


 


ALT   7 L  (14-59)  U/L


 


Alkaline Phosphatase   64  ()  U/L


 


Troponin I   < 0.017  (0.00-0.056)  ng/mL


 


Total Protein   6.7  (6.4-8.2)  g/dl


 


Albumin   4.1  (3.4-5.0)  g/dl


 


Globulin   2.6  gm/dL


 


Albumin/Globulin Ratio   1.6  (1-2)  














- Re-Assessments/Exams


Free Text/Narrative Re-Assessment/Exam: 





09/29/18 18:25


Well appearing, seems to be at baseline, and BP's here are very normal. No 

definite explanation for report of low BP at nursing home. Will check basic labs

, EKG, CXR, and if non-concerning anticipate dc back to [facility]. 


09/29/18 18:54


Labs all at baseline. She continues to be well appearing. Will dc back to [facility]. 





Departure





- Departure


Time of Disposition: 18:54


Disposition: Home, Self-Care 01


Condition: Fair


Clinical Impression: 


 Encounter for medical screening examination





Referrals: 


PCP,Unknown [Ordering Only Provider] - 


Forms:  ED Department Discharge


Additional Instructions: 


1. Continue your usual medications


2. Follow up with your primary care provider as needed


3. Return to the ED as needed for any new concerning symptoms, such as 

significant change in mental status, severe pain, fever, or other concerning 

symptoms.

## 2018-09-30 NOTE — CR
Chest: Portable view of the chest was obtained.

 

Comparison: Prior chest x-ray of 08/01/18.

 

Heart size is within normal limits.  Tortuous thoracic aorta is seen. 

 Lungs are clear.  Degenerative change noted within both shoulders.  

Scattered degenerative change is also noted within the spine.

 

Impression:

1.  Nothing acute is identified on portable chest x-ray.

 

Diagnostic code #2

## 2019-03-21 ENCOUNTER — HOSPITAL ENCOUNTER (EMERGENCY)
Dept: HOSPITAL 41 - JD.ED | Age: 84
Discharge: NURSING, REHAB OR LONG-TERM CARE | End: 2019-03-21
Payer: MEDICARE

## 2019-03-21 VITALS — DIASTOLIC BLOOD PRESSURE: 64 MMHG | SYSTOLIC BLOOD PRESSURE: 118 MMHG

## 2019-03-21 DIAGNOSIS — I10: ICD-10-CM
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
## 2019-03-21 DIAGNOSIS — Z79.82: ICD-10-CM

## 2019-03-21 DIAGNOSIS — S01.01XA: Primary | ICD-10-CM

## 2019-03-21 DIAGNOSIS — E78.00: ICD-10-CM

## 2019-03-21 DIAGNOSIS — S70.01XA: ICD-10-CM

## 2019-03-21 DIAGNOSIS — Z79.899: ICD-10-CM

## 2019-03-21 PROCEDURE — 70450 CT HEAD/BRAIN W/O DYE: CPT

## 2019-03-21 PROCEDURE — 12001 RPR S/N/AX/GEN/TRNK 2.5CM/<: CPT

## 2019-03-21 PROCEDURE — 99285 EMERGENCY DEPT VISIT HI MDM: CPT

## 2019-03-21 PROCEDURE — 73552 X-RAY EXAM OF FEMUR 2/>: CPT

## 2019-03-21 PROCEDURE — 73501 X-RAY EXAM HIP UNI 1 VIEW: CPT

## 2019-03-21 PROCEDURE — 73502 X-RAY EXAM HIP UNI 2-3 VIEWS: CPT

## 2019-03-21 NOTE — CR
Right femur: AP and lateral views of the right femur were obtained.

 

Comparison: No previous femur exam, previous pelvis and right hip exam

 of 03/06/19.

 

Stable greater trochanteric fracture is seen from previous exam.  

Right hip prosthesis is noted.  Vascular calcification and osteopenia 

are seen.  Mild degenerative change is noted within the lateral joint 

compartment of the knee.  No acute fracture or other abnormality is 

appreciated.

 

Impression:

1.  Stable greater trochanteric fracture.

2.  Other incidental findings.  Nothing acute is appreciated on right 

femur exam.

 

Diagnostic code #2

## 2019-03-21 NOTE — CR
Left hip: AP view of the left hip was obtained.

 

Joint space narrowing is seen within the superior left hip.  Bony 

structures are osteoporotic.  Vascular calcification is noted.  No 

acute fracture or other abnormality is seen.

 

Impression:

1.  Degenerative change.  Nothing acute is seen on single AP left hip 

exam.

 

Diagnostic code #2

## 2019-03-21 NOTE — CR
Pelvis and right hip: AP view of pelvis was obtained as well as AP and

 lateral views of the right hip.

 

Degenerative change is partially visualized within the lower lumbar 

spine.  Right hip prosthesis is noted.  Stable greater trochanteric 

fracture is seen.  Stable degenerative change is noted within the left

 hip.  Bony structures are osteoporotic.  Vascular calcification is 

noted.  Nothing acute is appreciated.

 

Impression:

1.  Stable findings as noted above.  Nothing acute is appreciated.

 

Diagnostic code #2

## 2019-03-21 NOTE — CT
Head CT

 

Technique: Multiple axial sections through the brain were obtained.  

Intravenous contrast was not utilized.

 

Comparison: Prior head CT study of of 12/19/18 and MRI brain of 

12/20/18.

 

Findings: Ventricles along with basal cisterns and sulci over the 

convexities are mildly prominent.  Mild diminished density is noted 

within portions of the periventricular white matter compatible with 

small vessel ischemic demyelination change.  No other abnormal 

parenchymal densities are seen.  No evidence of intracranial 

hemorrhage.  No midline shift or mass effect is seen.

 

Bone window settings were reviewed which shows soft tissue swelling 

within the left frontal scalp.  No acute calvarial abnormality is 

appreciated.  Visualized sinuses are clear.  Atherosclerotic 

calcification is noted within the carotid siphon.

 

Impression:

1.  Soft tissue swelling within the scalp.

2.  Mild senescent change as noted above.

3.  No acute intracranial abnormality or acute skull fracture is seen.

 

Diagnostic code #2

## 2019-03-21 NOTE — EDM.PDOC
ED HPI GENERAL MEDICAL PROBLEM





- General


Chief Complaint: Laceration


Stated Complaint: [facility]


Time Seen by Provider: 03/21/19 15:49


Source of Information: Reports: Patient (Daughter), EMS, Family, RN Notes 

Reviewed





- History of Present Illness


INITIAL COMMENTS - FREE TEXT/NARRATIVE: 





86-year-old female fell at the nursing home. She does have dementia, is 

supposed to be up with assistance only. She does not walk. She apparently was 

on the toilet, tried to get up and fell forward suffering laceration injury to 

the top of her head. Her daughter is also worried about her right hip. She did 

suffer a right hip fracture about 2 or 3 months ago, had hip replacement 

surgery at that time. The patient with her dementia is not able to really 

answer questions in a reasonable way. She does not remember what happened.


  ** Head


Pain Score (Numeric/FACES): 5





- Related Data


 Allergies











Allergy/AdvReac Type Severity Reaction Status Date / Time


 


No Known Allergies Allergy   Verified 03/21/19 15:55











Home Meds: 


 Home Meds





Acetaminophen 650 mg PO BID 06/10/15 [History]


Carbidopa/Levodopa [Sinemet  mg] 1 tab PO BID 06/10/15 [History]


Carbidopa/Levodopa [Sinemet Cr  mg] 1 tab PO QID 06/10/15 [History]


Sertraline HCl [Zoloft] 100 mg PO DAILY 06/10/15 [History]


Simethicone [Gas-X] 80 mg PO WITHMEALSANDBED 06/10/15 [History]


clonazePAM [Klonopin] 0.25 mg PO BEDTIME 06/10/15 [History]


rOPINIRole HCl [Requip] 4 mg PO BID 06/10/15 [History]


Cholecalciferol (Vitamin D3) [Vitamin D3] 2,000 unit PO DAILY 07/04/16 [History]


Sennosides [Senna] 1 tab PO BID 07/04/16 [History]


Calcium Carbonate [Calcium] 500 mg PO DAILY 10/21/17 [History]


Cyanocobalamin (Vitamin B12) [Vitamin B12] 1,000 mcg PO DAILY 10/21/17 [History]


Enalapril [Vasotec] 2.5 mg PO DAILY 10/21/17 [History]


Oxybutynin 2.5 mg PO BID 10/21/17 [History]


Polyethylene Glycol 3350 [MiraLAX] 17 g PO DAILY 10/21/17 [History]


Bisacodyl [Biscolax] 10 mg RECTAL DAILY PRN 08/01/18 [History]


Bisacodyl [Dulcolax] 10 mg PO DAILY PRN 08/01/18 [History]


Calcium Carbonate [Tums] 400 mg PO DAILY PRN 08/01/18 [History]


Dextran 70/Hypromellose [Artificial Tears] 1 drop EYEBOTH BID 08/01/18 [History]


Naproxen Sodium [Aleve] 220 mg PO BID 08/01/18 [History]


QUEtiapine [SEROquel] 50 mg PO TID 08/01/18 [History]


Ranitidine [Zantac] 150 mg PO BID PRN 08/01/18 [History]


traMADol [Ultram] 50 mg PO BID 08/01/18 [History]


Acetaminophen/HYDROcodone [Norco 325-5 MG] 1 tab PO Q6H PRN #12 tablet 12/16/18 

[Rx]


Acetaminophen 650 mg PO Q4H PRN 12/20/18 [History]


Aspirin [Adult Low Dose Aspirin EC] 162 mg PO DAILY #60 12/20/18 [Rx]


Rivastigmine Tartrate [Rivastigmine] 1.5 mg PO BID 03/21/19 [History]











Past Medical History


HEENT History: Reports: Impaired Vision


Other HEENT History: esophogeal reflux


Cardiovascular History: Reports: High Cholesterol, Hypertension


Other Cardiovascular History: edema


Respiratory History: Reports: SOB


Gastrointestinal History: Reports: Chronic Constipation, GERD


Genitourinary History: Reports: Urinary Incontinence


Other Genitourinary History: hyperhidrosis


OB/GYN History: Reports: Pregnancy


Other OB/GYN History: Stillborn birth


Musculoskeletal History: Reports: Osteoarthritis, Other (See Below)


Other Musculoskeletal History: cervical disc degeneration, chronic pain, 

restless legs syndrome


Neurological History: Reports: Parkinson's


Other Neuro History: unsteady gait, altered mental status, RLS, myestenia gravis


Psychiatric History: Reports: Anxiety, Dementia, Depression, Mood Swings, 

Psychosis


Endocrine/Metabolic History: Reports: Other (See Below)


Other Endocrine/Metabolic History: hypomagnesmia





- Infectious Disease History


Infectious Disease History: Reports: C-Difficile





- Past Surgical History


Female  Surgical History: Reports: Hysterectomy


Musculoskeletal Surgical History: Reports: Hip Replacement


Other Musculoskeletal Surgeries/Procedures:: current fx hip Right side





Social & Family History





- Family History


Family Medical History: Noncontributory


Neurological: Reports: Parkinson's


Endocrine/Metabolic: Reports: Davidson's Disease, Diabetes, type II





- Tobacco Use


Smoking Status *Q: Never Smoker


Second Hand Smoke Exposure: No





- Caffeine Use


Caffeine Use: Reports: Coffee


Caffeine Use Comment: Drinks one cup of coffee with breakfast, soda once a week





- Recreational Drug Use


Recreational Drug Use: No





- Living Situation & Occupation


Living situation: Reports: Extended Care Facility (Living in [facility]), Assisted Living


Occupation: Retired





ED ROS GENERAL





- Review of Systems


Review Of Systems: Unable To Obtain (Patient has quite severe dementia, unable 

to give accurate review of systems history)





ED EXAM, SKIN/RASH


Exam: See Below


General Appearance: Alert, Other (Confused)


Eye Exam: Bilateral Eye: PERRL


Ears: Normal External Exam


Nose: Normal Inspection


Throat/Mouth: Normal Inspection


Head: Other (1.570 laceration frontal superior scalp shallow but gaping)


Neck: Non-Tender


Respiratory/Chest: No Respiratory Distress, Lungs Clear, Chest Non-Tender


Cardiovascular: Regular Rate, Rhythm


GI/Abdominal: Soft, Non-Tender


Extremities: Normal Inspection, Other (Question mild tenderness right hip and 

left hip)


Neurological: Alert, Confused


Skin: Warm, Dry





ED SKIN PROCEDURES





- Laceration/Wound Repair


  ** Head


Lac/Wound length In cm: 1.5


Appearance: Linear


Distal NVT: Neuro & Vascular Intact


Anesthetic Type: Local


Local Anesthesia - Lidocaine (Xylocaine): 1% Plain


Skin Prep: Saline


Suture Size: 3-0


# of Sutures: 3





Course





- Vital Signs


Last Recorded V/S: 


 Last Vital Signs











Temp  98.3 F   03/21/19 15:50


 


Pulse  76   03/21/19 15:50


 


Resp  18   03/21/19 15:50


 


BP  118/64   03/21/19 15:50


 


Pulse Ox  97   03/21/19 15:50














- Orders/Labs/Meds


Meds: 


Medications














Discontinued Medications














Generic Name Dose Route Start Last Admin





  Trade Name Freq  PRN Reason Stop Dose Admin


 


Lidocaine HCl  50 ml  03/21/19 17:36  03/21/19 17:48





  Xylocaine 1%  INJECT  03/21/19 17:37  50 ml





  ONETIME ONE   Administration





     





     





     





     














- Re-Assessments/Exams


Free Text/Narrative Re-Assessment/Exam: 





03/22/19 15:24


X-rays of her hips pelvis and right femur were all negative for fracture. 

Laceration superior scalp was sutured.


03/22/19 15:24. Head CT did not show any acute findings.








Departure





- Departure


Time of Disposition: 17:35


Disposition: DC/Tfer to [facility]


Condition: Fair


Clinical Impression: 


Fall


Qualifiers:


 Encounter type: initial encounter Qualified Code(s): W19.XXXA - Unspecified 

fall, initial encounter





Scalp laceration


Qualifiers:


 Encounter type: initial encounter Qualified Code(s): S01.01XA - Laceration 

without foreign body of scalp, initial encounter





Contusion, hip and thigh


Qualifiers:


 Encounter type: initial encounter Laterality: right Qualified Code(s): 

S70.01XA - Contusion of right hip, initial encounter








- Discharge Information


Instructions:  Contusion, Laceration Care, Adult


Referrals: 


Phill Nielsen MD [Primary Care Provider] - 


Forms:  ED Department Discharge


Additional Instructions: 


Continue present care, laceration care instructions, stitches out in about 8 

days, antibiotic ointment to area of injury twice daily,  up with assistance 

only.